# Patient Record
Sex: FEMALE | URBAN - NONMETROPOLITAN AREA
[De-identification: names, ages, dates, MRNs, and addresses within clinical notes are randomized per-mention and may not be internally consistent; named-entity substitution may affect disease eponyms.]

---

## 2017-06-23 RX ORDER — ESCITALOPRAM OXALATE 20 MG/1
TABLET ORAL
Qty: 90 TABLET | Refills: 0 | Status: SHIPPED | OUTPATIENT
Start: 2017-06-23 | End: 2017-10-23

## 2017-10-24 RX ORDER — ESCITALOPRAM OXALATE 20 MG/1
TABLET ORAL
Qty: 90 TABLET | Refills: 0 | Status: SHIPPED | OUTPATIENT
Start: 2017-10-24 | End: 2018-01-03

## 2018-01-03 ENCOUNTER — TELEPHONE (OUTPATIENT)
Dept: FAMILY MEDICINE CLINIC | Facility: CLINIC | Age: 38
End: 2018-01-03

## 2018-01-03 RX ORDER — ESCITALOPRAM OXALATE 20 MG/1
TABLET ORAL
Qty: 90 TABLET | Refills: 1 | Status: SHIPPED | OUTPATIENT
Start: 2018-01-03 | End: 2018-07-23

## 2018-07-23 RX ORDER — ESCITALOPRAM OXALATE 20 MG/1
TABLET ORAL
Qty: 90 TABLET | Refills: 1 | Status: SHIPPED | OUTPATIENT
Start: 2018-07-23 | End: 2020-09-30

## 2019-03-08 ENCOUNTER — TELEPHONE (OUTPATIENT)
Dept: FAMILY MEDICINE CLINIC | Facility: CLINIC | Age: 39
End: 2019-03-08

## 2019-03-08 ENCOUNTER — HOSPITAL ENCOUNTER (OUTPATIENT)
Dept: GENERAL RADIOLOGY | Age: 39
Discharge: HOME | End: 2019-03-08
Attending: FAMILY MEDICINE
Payer: COMMERCIAL

## 2019-03-08 ENCOUNTER — OFFICE VISIT (OUTPATIENT)
Dept: FAMILY MEDICINE CLINIC | Facility: CLINIC | Age: 39
End: 2019-03-08
Payer: COMMERCIAL

## 2019-03-08 VITALS
SYSTOLIC BLOOD PRESSURE: 126 MMHG | DIASTOLIC BLOOD PRESSURE: 62 MMHG | TEMPERATURE: 98 F | RESPIRATION RATE: 16 BRPM | HEART RATE: 82 BPM | WEIGHT: 169 LBS

## 2019-03-08 DIAGNOSIS — R07.81 RIB PAIN: Primary | ICD-10-CM

## 2019-03-08 DIAGNOSIS — R07.81 RIB PAIN: ICD-10-CM

## 2019-03-08 DIAGNOSIS — S20.212A RIB CONTUSION, LEFT, INITIAL ENCOUNTER: Primary | ICD-10-CM

## 2019-03-08 PROCEDURE — 99214 OFFICE O/P EST MOD 30 MIN: CPT | Performed by: FAMILY MEDICINE

## 2019-03-08 PROCEDURE — 71101 X-RAY EXAM UNILAT RIBS/CHEST: CPT | Performed by: FAMILY MEDICINE

## 2019-03-08 RX ORDER — HYDROCODONE BITARTRATE AND ACETAMINOPHEN 10; 325 MG/1; MG/1
1 TABLET ORAL EVERY 6 HOURS PRN
Qty: 30 TABLET | Refills: 0 | Status: SHIPPED | OUTPATIENT
Start: 2019-03-08 | End: 2019-07-17 | Stop reason: ALTCHOICE

## 2019-03-08 NOTE — TELEPHONE ENCOUNTER
Pt states about a week and half ago they were at a cattle show. 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 She carried 30 lb bucket for about a half a mile and thinks she pulled something from that.  Pt did see the chiropractor 3 times and t

## 2019-03-08 NOTE — PATIENT INSTRUCTIONS
Rib Contusion     A rib contusion is a bruise to one or more rib bones. It may cause pain, tenderness, swelling and a purplish discoloration. There may be a sharp pain while breathing. You will be assessed for other injuries.  You will likely be given pa © 0007-6050 [address] [facility] [place], [place], [facility] [place]. All rights reserved. This information is not intended as a substitute for professional medical care. Always follow your healthcare professional's instructions.

## 2019-03-08 NOTE — PROGRESS NOTES
Karissa Boo is a 41year old female. HPI:   Estelle Banks is here  For left mid axillary ribs 5-8 tender and sharp pain. After carrying a 5 gallon full of feed bucket. The next day she felt the sharp pain ( 2 weeks ago).  Went to chiropractor 3 times and adjustm Signed Prescriptions Disp Refills   • HYDROcodone-acetaminophen (NORCO)  MG Oral Tab 30 tablet 0     Sig: Take 1 tablet by mouth every 6 (six) hours as needed for Pain.        Imaging & Consults:  None    Salon pause   Lidocaine patches  norco 10

## 2019-03-25 ENCOUNTER — TELEPHONE (OUTPATIENT)
Dept: CARDIOLOGY | Age: 39
End: 2019-03-25

## 2019-04-06 ENCOUNTER — APPOINTMENT (OUTPATIENT)
Dept: CARDIOLOGY | Age: 39
End: 2019-04-06

## 2019-04-10 PROBLEM — Z92.89 HISTORY OF EXERCISE STRESS TEST: Status: ACTIVE | Noted: 2019-04-10

## 2019-05-01 ENCOUNTER — MED REC SCAN ONLY (OUTPATIENT)
Dept: FAMILY MEDICINE CLINIC | Facility: CLINIC | Age: 39
End: 2019-05-01

## 2019-07-17 ENCOUNTER — OFFICE VISIT (OUTPATIENT)
Dept: FAMILY MEDICINE CLINIC | Facility: CLINIC | Age: 39
End: 2019-07-17
Payer: COMMERCIAL

## 2019-07-17 VITALS
DIASTOLIC BLOOD PRESSURE: 64 MMHG | WEIGHT: 161 LBS | HEIGHT: 65 IN | HEART RATE: 87 BPM | OXYGEN SATURATION: 98 % | RESPIRATION RATE: 18 BRPM | BODY MASS INDEX: 26.82 KG/M2 | SYSTOLIC BLOOD PRESSURE: 118 MMHG | TEMPERATURE: 99 F
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
## 2019-07-17 DIAGNOSIS — S70.362A INSECT BITE OF LEFT THIGH, INITIAL ENCOUNTER: Primary | ICD-10-CM

## 2019-07-17 DIAGNOSIS — L29.9 ITCH OF SKIN: ICD-10-CM

## 2019-07-17 PROCEDURE — 99213 OFFICE O/P EST LOW 20 MIN: CPT | Performed by: NURSE PRACTITIONER

## 2019-07-17 RX ORDER — MOMETASONE FUROATE 1 MG/G
1 CREAM TOPICAL DAILY
Qty: 45 G | Refills: 0 | Status: SHIPPED | OUTPATIENT
Start: 2019-07-17 | End: 2022-01-05

## 2019-07-17 RX ORDER — PREDNISONE 20 MG/1
20 TABLET ORAL 2 TIMES DAILY
Qty: 10 TABLET | Refills: 0 | Status: SHIPPED | OUTPATIENT
Start: 2019-07-17 | End: 2019-07-22

## 2019-07-17 NOTE — PROGRESS NOTES
HPI:   Rash   This is a new problem. 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 She isn't sure what stung her because she didn't see anything. The problem has been gradually worsening since onset.  The affected locat Pulmonary/Chest: Effort normal and breath sounds normal. No respiratory distress.    Skin:        Erythematous flat rash on posterior left upper thigh, warm to touch, no induration          ASSESSMENT AND PLAN:   Diagnoses and all orders for this visit:

## 2020-08-18 NOTE — PROGRESS NOTES
Frances Rico is a 47year old female. HPI:   Has a lot of perimenopausal symptoms. hot flashes, irritable, fatigued. 28-30 day period cycle. Hot a low. Echo, stress test, ekg is normal. Running a lot and still gaining weight.   HR will be from 60 to 170 w prozac.  - COMP METABOLIC PANEL (14)  - ESTRADIOL  - PROGESTERONE  - FSH AND LH  - CBC WITH DIFFERENTIAL WITH PLATELET  - TSH+FREE T4; Future    2.  Perimenopausal  - discussed above  - may need hrt to help balance out symptoms  - COMP METABOLIC PANEL (14)

## 2020-08-18 NOTE — PATIENT INSTRUCTIONS
Coping with Perimenopause     Being active in ways you enjoy can help you feel better. For most women, the symptoms of perimenopause subside after entering menopause, although hot flashes and vaginal dryness can continue after periods have stopped.  In · What happens: In the absence of estrogen, the lining of the vagina can become thin and dry. This can make sex painful. Vaginal infections may also be more likely. · What you can do: Apply a water-based lubricant before having sex.  If you are not using c You may notice that you’re not as interested in sex as you used to be. This is very common during perimenopause. Since you’re more likely to enjoy sex when you’re comfortable, getting your symptoms under control might help.  Talk to your healthcare provider As a woman ages, her ovaries begin to make hormones less regularly. In some months, there may not be ovulation. Without ovulation, progesterone isn't secreted so a normal period doesn't occur. The menstrual cycle will be harder to predict.  Over time, the o © 8041-1431 [address]. [address] [facility] [place], [facility] [place]. All rights reserved. This information is not intended as a substitute for professional medical care. Always follow your healthcare professional's instructions.

## 2020-08-24 ENCOUNTER — TELEPHONE (OUTPATIENT)
Dept: FAMILY MEDICINE CLINIC | Facility: CLINIC | Age: 40
End: 2020-08-24

## 2020-09-14 DIAGNOSIS — R00.0 TACHYCARDIA: ICD-10-CM

## 2020-09-14 RX ORDER — METOPROLOL SUCCINATE 25 MG/1
TABLET, EXTENDED RELEASE ORAL
Qty: 30 TABLET | Refills: 0 | Status: SHIPPED | OUTPATIENT
Start: 2020-09-14 | End: 2021-06-30 | Stop reason: ALTCHOICE

## 2020-09-14 NOTE — TELEPHONE ENCOUNTER
Last refill: 08/18/20  Qty: 30  W/ 0 refills  Last ov: 08/18/20    Requested Prescriptions     Pending Prescriptions Disp Refills   • METOPROLOL SUCCINATE ER 25 MG Oral Tablet 24 Hr [Pharmacy Med Name: METOPROLOL ER SUCCINATE 25MG TABS] 30 tablet 0     Sig

## 2020-09-30 ENCOUNTER — OFFICE VISIT (OUTPATIENT)
Dept: FAMILY MEDICINE CLINIC | Facility: CLINIC | Age: 40
End: 2020-09-30
Payer: COMMERCIAL

## 2020-09-30 VITALS
DIASTOLIC BLOOD PRESSURE: 70 MMHG | BODY MASS INDEX: 27.78 KG/M2 | SYSTOLIC BLOOD PRESSURE: 120 MMHG | HEART RATE: 67 BPM | OXYGEN SATURATION: 98 % | RESPIRATION RATE: 16 BRPM | HEIGHT: 67 IN | TEMPERATURE: 99 F | WEIGHT: 177 LBS

## 2020-09-30 DIAGNOSIS — N95.1 PERIMENOPAUSAL: ICD-10-CM

## 2020-09-30 DIAGNOSIS — R00.0 TACHYCARDIA: ICD-10-CM

## 2020-09-30 DIAGNOSIS — F41.9 ANXIETY AND DEPRESSION: ICD-10-CM

## 2020-09-30 DIAGNOSIS — R63.5 WEIGHT GAIN: ICD-10-CM

## 2020-09-30 DIAGNOSIS — Z79.899 ENCOUNTER FOR DRUG THERAPY: Primary | ICD-10-CM

## 2020-09-30 DIAGNOSIS — F32.A ANXIETY AND DEPRESSION: ICD-10-CM

## 2020-09-30 PROCEDURE — 3078F DIAST BP <80 MM HG: CPT | Performed by: FAMILY MEDICINE

## 2020-09-30 PROCEDURE — 3074F SYST BP LT 130 MM HG: CPT | Performed by: FAMILY MEDICINE

## 2020-09-30 PROCEDURE — 3008F BODY MASS INDEX DOCD: CPT | Performed by: FAMILY MEDICINE

## 2020-09-30 PROCEDURE — 99214 OFFICE O/P EST MOD 30 MIN: CPT | Performed by: FAMILY MEDICINE

## 2020-09-30 RX ORDER — METOPROLOL SUCCINATE 25 MG/1
25 TABLET, EXTENDED RELEASE ORAL DAILY
Qty: 90 TABLET | Refills: 3 | Status: SHIPPED | OUTPATIENT
Start: 2020-09-30 | End: 2021-04-30

## 2020-09-30 RX ORDER — NORETHINDRONE ACETATE AND ETHINYL ESTRADIOL, ETHINYL ESTRADIOL AND FERROUS FUMARATE 1MG-10(24)
1 KIT ORAL DAILY
Qty: 3 PACKAGE | Refills: 3 | Status: SHIPPED | OUTPATIENT
Start: 2020-09-30 | End: 2020-10-28

## 2020-09-30 RX ORDER — CLOBETASOL PROPIONATE 0.5 MG/G
1 OINTMENT TOPICAL 2 TIMES DAILY
Qty: 60 G | Refills: 1 | Status: SHIPPED | OUTPATIENT
Start: 2020-09-30 | End: 2020-10-28

## 2020-09-30 NOTE — PROGRESS NOTES
Mary Jo Farfan is a 47year old female. HPI:   Mitul Robertson is here for follow up on anxiety. Has been on lexapro 20 mg for a long time. Metoprolol 25 mg daily and ocp Is feeling better. Sleep is good. No palpitations. Perimenopausal symptoms much improved.  Still reviewed response to meds    2. Anxiety and depression  - stop lexapro 20  Mg and start zoloft 50 mg  - may need to add wellbutrin SR for sexual dysfunction - worsening anxiety    3.  Perimenopausal  - continue lo loestrin refilled for 1 year  - metoprolol

## 2020-09-30 NOTE — PATIENT INSTRUCTIONS
Escitalopram tablets  Brand Name: Lexapro  What is this medicine? ESCITALOPRAM (es sye FIDELIA oh pram) is used to treat depression and certain types of anxiety. How should I use this medicine? Take this medicine by mouth with a glass of water.  Follow the · change in sex drive or performance  · headache  · increased sweating  · indigestion, nausea  · tremors  What may interact with this medicine?   Do not take this medicine with any of the following medications:  · certain medicines for fungal infections lik · diabetes  · glaucoma  · heart disease  · kidney or liver disease  · receiving electroconvulsive therapy  · seizures (convulsions)  · suicidal thoughts, plans, or attempt by you or a family member  · an unusual or allergic reaction to escitalopram, the re

## 2020-11-16 ENCOUNTER — TELEPHONE (OUTPATIENT)
Dept: FAMILY MEDICINE CLINIC | Facility: CLINIC | Age: 40
End: 2020-11-16

## 2020-11-16 RX ORDER — ALPRAZOLAM 0.25 MG/1
0.25 TABLET ORAL EVERY 4 HOURS PRN
Qty: 5 TABLET | Refills: 0 | Status: SHIPPED | OUTPATIENT
Start: 2020-11-16 | End: 2020-12-15

## 2020-11-16 NOTE — TELEPHONE ENCOUNTER
She tested + for covid on halloween, had \"sick\" sx for 3 days. Taste and smell returned after 10 days. She had \"a few\" drinks Jaime Contreras) with dinned on Friday evening. All day Saturday was \"super nauseous\" no vomiting. Had a brief episode on Sunday. Recently started on Lo-Estrin Fe, & sertraline 9/30. Wondering if those are contributing to the nausea. Also c/o mental fog, from the covid? Also recently has developed increase in anxiety, wonders if it is a result of the addition of the covid since she already had a dx of anxiety. Or does she need increase in the sertraline. Also asking for refill of the alprazolam, was last refilled 4/20. States she takes about 1 a week. The alprazolam was found in look-up through [facility]. 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

## 2020-11-16 NOTE — TELEPHONE ENCOUNTER
Yes the nausea can be from the sertraline and estrogen or maybe the combination of the two. I think it best to stay away from alprazolam, but will order 5 for rescue.

## 2020-11-23 NOTE — TELEPHONE ENCOUNTER
Patient calling asking if she can increase zoloft. Patient is currently taking 50mg. Last office visit was on 9/30/2020. Patient states that she was doing very well until about one month ago when she was diagnosed with covid.   She has been having increa

## 2020-11-24 NOTE — TELEPHONE ENCOUNTER
Yes can increase to 75 mg take 1.5 tabs. ( will need to cut tabs in half) script sent to pharmacy. Follow up in 1 month to assess response. Can do doximity.

## 2020-11-24 NOTE — TELEPHONE ENCOUNTER
Spoke with patient and advised of Dr. Suhas López' recommendations.  Patient already has appt scheduled in Dec.  Future Appointments   Date Time Provider Sam Gonzalez   12/15/2020 10:15 AM Suhas Archer, DO [facility] [place]

## 2020-12-15 ENCOUNTER — OFFICE VISIT (OUTPATIENT)
Dept: FAMILY MEDICINE CLINIC | Facility: CLINIC | Age: 40
End: 2020-12-15
Payer: COMMERCIAL

## 2020-12-15 VITALS
RESPIRATION RATE: 16 BRPM | DIASTOLIC BLOOD PRESSURE: 84 MMHG | TEMPERATURE: 98 F | WEIGHT: 177 LBS | SYSTOLIC BLOOD PRESSURE: 118 MMHG | BODY MASS INDEX: 28 KG/M2 | HEART RATE: 64 BPM

## 2020-12-15 DIAGNOSIS — R00.0 TACHYCARDIA: ICD-10-CM

## 2020-12-15 DIAGNOSIS — F41.9 ANXIETY AND DEPRESSION: ICD-10-CM

## 2020-12-15 DIAGNOSIS — N95.1 PERIMENOPAUSAL: ICD-10-CM

## 2020-12-15 DIAGNOSIS — F32.A ANXIETY AND DEPRESSION: ICD-10-CM

## 2020-12-15 DIAGNOSIS — Z79.899 ENCOUNTER FOR DRUG THERAPY: Primary | ICD-10-CM

## 2020-12-15 PROCEDURE — 3074F SYST BP LT 130 MM HG: CPT | Performed by: FAMILY MEDICINE

## 2020-12-15 PROCEDURE — 99214 OFFICE O/P EST MOD 30 MIN: CPT | Performed by: FAMILY MEDICINE

## 2020-12-15 PROCEDURE — 3079F DIAST BP 80-89 MM HG: CPT | Performed by: FAMILY MEDICINE

## 2020-12-15 RX ORDER — BUPROPION HYDROCHLORIDE 150 MG/1
150 TABLET, EXTENDED RELEASE ORAL 2 TIMES DAILY
Qty: 60 TABLET | Refills: 0 | Status: SHIPPED | OUTPATIENT
Start: 2020-12-15 | End: 2020-12-29

## 2020-12-15 RX ORDER — ALPRAZOLAM 0.25 MG/1
0.25 TABLET ORAL EVERY 4 HOURS PRN
Qty: 5 TABLET | Refills: 0 | Status: SHIPPED | OUTPATIENT
Start: 2020-12-15 | End: 2021-01-25

## 2020-12-15 NOTE — TELEPHONE ENCOUNTER
LOV  12/15/20  Today    LAST LAB    LAST RX  11/16/20 5 tabs    Next OV  No future appointments.     PROTOCOL  none

## 2020-12-15 NOTE — PROGRESS NOTES
Rio Armas is a 47year old female. HPI:   Andrew Sadler is here for follow up on increase of sertaline to 75 mg for her anxiety. . perimenopausal symptoms better with metoprolol 25 mg. Got sick with covid before Halloween.  Congestion  Smell and loss of taste heartburn  NEURO: denies headaches    EXAM:   /84   Pulse 64   Temp 98.1 °F (36.7 °C) (Temporal)   Resp 16   Wt 177 lb (80.3 kg)   LMP 11/18/2020 (Exact Date)   Breastfeeding No   BMI 27.72 kg/m²   GENERAL: well developed, well nourished,in no appare

## 2020-12-29 ENCOUNTER — TELEPHONE (OUTPATIENT)
Dept: FAMILY MEDICINE CLINIC | Facility: CLINIC | Age: 40
End: 2020-12-29

## 2020-12-29 RX ORDER — BUPROPION HYDROCHLORIDE 150 MG/1
150 TABLET ORAL DAILY
Qty: 90 TABLET | Refills: 0 | Status: SHIPPED | OUTPATIENT
Start: 2020-12-29 | End: 2021-03-16

## 2020-12-29 NOTE — TELEPHONE ENCOUNTER
Have Rheba Session take the 2nd dose earlier. Half hour before symptoms start.  IE if anxious at 3 pm take at 2:30 pm etc.    Next refill will do wllbuterin  mg

## 2020-12-29 NOTE — TELEPHONE ENCOUNTER
Patient wants to speak with a nurse regarding her new medication and how it is doing. Patient would not elaborate any further.

## 2020-12-29 NOTE — TELEPHONE ENCOUNTER
Spoke with patient and gave her Dr. Howard Montenegro' recommendation. Patient states she is getting anxiety symptoms late morning, not mid afternoon. Advised per Dr. Howard Montenegro to start new Rx of Wellbutrin  mg daily and discontinue the twice a day Wellbutrin.  Hussain Layton

## 2020-12-29 NOTE — TELEPHONE ENCOUNTER
Patient states she feels she is getting good results with the Welbutrin and is also taking 75mg of Zoloft. Patient states she is experiencing episodes of anxiety about midday or shortly before she takes her 2nd dose of Welbutrin.  She is wondering if she ne

## 2021-01-25 ENCOUNTER — TELEPHONE (OUTPATIENT)
Dept: FAMILY MEDICINE CLINIC | Facility: CLINIC | Age: 41
End: 2021-01-25

## 2021-01-25 RX ORDER — ALPRAZOLAM 0.25 MG/1
0.25 TABLET ORAL EVERY 4 HOURS PRN
Qty: 5 TABLET | Refills: 0 | Status: SHIPPED | OUTPATIENT
Start: 2021-01-25 | End: 2021-02-15

## 2021-01-25 NOTE — TELEPHONE ENCOUNTER
Patient is here with daughter for visit. Asking for refill of Xanax.     Last Office Visit: 12/15/20  Last Refill: 12/15/20 #5 no refill

## 2021-02-15 RX ORDER — ALPRAZOLAM 0.25 MG/1
0.25 TABLET ORAL EVERY 4 HOURS PRN
Qty: 5 TABLET | Refills: 0 | Status: SHIPPED | OUTPATIENT
Start: 2021-02-15 | End: 2021-03-08

## 2021-02-15 NOTE — TELEPHONE ENCOUNTER
Last refill #5 on 1/25/2021  Last office visit pertaining to refill on 12/15/2020  No future appointments.

## 2021-03-08 DIAGNOSIS — F41.9 ANXIETY: ICD-10-CM

## 2021-03-08 RX ORDER — ALPRAZOLAM 0.25 MG/1
TABLET ORAL
Qty: 5 TABLET | Refills: 0 | Status: SHIPPED | OUTPATIENT
Start: 2021-03-08 | End: 2021-04-05

## 2021-03-08 NOTE — TELEPHONE ENCOUNTER
Last refill on alprazolam #5 on 2/15/2021  Last refill on sertraline #135 on 11/23/2020  Last office visit pertaining to refill on 12/15/2020  No future appointments.   Next office visit due on or around 6/15/2021

## 2021-03-16 RX ORDER — BUPROPION HYDROCHLORIDE 150 MG/1
TABLET ORAL
Qty: 90 TABLET | Refills: 0 | Status: SHIPPED | OUTPATIENT
Start: 2021-03-16 | End: 2021-06-14

## 2021-03-16 NOTE — TELEPHONE ENCOUNTER
LOV: 12-    LAST LAB: 8-    LAST RX:    Medication Quantity Refills Start End   buPROPion HCl ER, XL, 150 MG Oral Tablet 24 Hr 90 tablet 0 12/29/2020    Sig:   Take 1 tablet (150 mg total) by mouth daily.            Next OV: No future appointm

## 2021-03-29 ENCOUNTER — PATIENT MESSAGE (OUTPATIENT)
Dept: FAMILY MEDICINE CLINIC | Facility: CLINIC | Age: 41
End: 2021-03-29

## 2021-03-29 RX ORDER — BUPROPION HYDROCHLORIDE 300 MG/1
300 TABLET ORAL DAILY
Qty: 1 TABLET | Refills: 0 | COMMUNITY
Start: 2021-03-29 | End: 2021-06-14 | Stop reason: DRUGHIGH

## 2021-03-29 NOTE — TELEPHONE ENCOUNTER
From: Negin Calabrese  To: Thor Ellis, DO  Sent: 3/29/2021 8:50 AM CDT  Subject: Visit Follow-up Question    Hi Dr Karren Halsted- twice in the last 4 days I've woke up feeling really tachy, diaphoretic, and like I'm going to pass out.  It lasts 10-15 min and th

## 2021-04-05 RX ORDER — ALPRAZOLAM 0.25 MG/1
0.25 TABLET ORAL EVERY 4 HOURS PRN
Qty: 5 TABLET | Refills: 0 | Status: SHIPPED | OUTPATIENT
Start: 2021-04-05 | End: 2021-04-30

## 2021-04-05 NOTE — TELEPHONE ENCOUNTER
Last refill #5 on 3/58/2021  Last office visit pertaining to refill on 12/15/2020  No future appointments.

## 2021-04-14 ENCOUNTER — PATIENT MESSAGE (OUTPATIENT)
Dept: FAMILY MEDICINE CLINIC | Facility: CLINIC | Age: 41
End: 2021-04-14

## 2021-04-14 DIAGNOSIS — J45.990 EXERCISE-INDUCED ASTHMA: Primary | ICD-10-CM

## 2021-04-14 RX ORDER — ALBUTEROL SULFATE 90 UG/1
2 AEROSOL, METERED RESPIRATORY (INHALATION) EVERY 4 HOURS PRN
Qty: 1 INHALER | Refills: 6 | Status: SHIPPED | OUTPATIENT
Start: 2021-04-14

## 2021-04-14 RX ORDER — ALBUTEROL SULFATE 2.5 MG/3ML
2.5 SOLUTION RESPIRATORY (INHALATION) EVERY 4 HOURS PRN
Qty: 50 AMPULE | Refills: 3 | Status: SHIPPED | OUTPATIENT
Start: 2021-04-14

## 2021-04-14 NOTE — TELEPHONE ENCOUNTER
From: Edelmira Mi  To: Nandini Carrion DO  Sent: 4/14/2021 2:49 PM CDT  Subject: Prescription Question    Hi Dr Sandra Fabian. I have an appt in a couple weeks to follow up. The buspar is working really well so far!  I do have a question regarding getting albu

## 2021-04-30 ENCOUNTER — OFFICE VISIT (OUTPATIENT)
Dept: FAMILY MEDICINE CLINIC | Facility: CLINIC | Age: 41
End: 2021-04-30
Payer: COMMERCIAL

## 2021-04-30 VITALS
DIASTOLIC BLOOD PRESSURE: 82 MMHG | TEMPERATURE: 98 F | BODY MASS INDEX: 30 KG/M2 | WEIGHT: 190.38 LBS | HEART RATE: 62 BPM | SYSTOLIC BLOOD PRESSURE: 122 MMHG

## 2021-04-30 DIAGNOSIS — N95.1 PERIMENOPAUSAL: ICD-10-CM

## 2021-04-30 DIAGNOSIS — R00.0 TACHYCARDIA: ICD-10-CM

## 2021-04-30 DIAGNOSIS — Z79.899 ENCOUNTER FOR DRUG THERAPY: Primary | ICD-10-CM

## 2021-04-30 DIAGNOSIS — F41.9 ANXIETY: ICD-10-CM

## 2021-04-30 PROCEDURE — 99214 OFFICE O/P EST MOD 30 MIN: CPT | Performed by: FAMILY MEDICINE

## 2021-04-30 PROCEDURE — 3079F DIAST BP 80-89 MM HG: CPT | Performed by: FAMILY MEDICINE

## 2021-04-30 PROCEDURE — 3074F SYST BP LT 130 MM HG: CPT | Performed by: FAMILY MEDICINE

## 2021-04-30 RX ORDER — METOPROLOL SUCCINATE 50 MG/1
50 TABLET, EXTENDED RELEASE ORAL DAILY
Qty: 90 TABLET | Refills: 0 | Status: SHIPPED | OUTPATIENT
Start: 2021-04-30 | End: 2021-07-26

## 2021-04-30 RX ORDER — BUSPIRONE HYDROCHLORIDE 10 MG/1
10 TABLET ORAL 3 TIMES DAILY
Qty: 90 TABLET | Refills: 1 | Status: SHIPPED | OUTPATIENT
Start: 2021-04-30 | End: 2021-06-26

## 2021-04-30 NOTE — PROGRESS NOTES
Catherine Escobedo is a 47year old female. HPI:   Flaquita Bloom shares she feels 70% better on buspar 10 mg bid. Still feels anxiety increasing as the day goes on.  Sweating has decreased with decrease of zoloft down to 50 mg and doing well will wellbutrin  mg Smoker      Smokeless tobacco: Never Used    Vaping Use      Vaping Use: Never used    Alcohol use: Yes    Drug use: No       REVIEW OF SYSTEMS:   GENERAL HEALTH: feels well otherwise  SKIN: denies any unusual skin lesions or rashes  RESPIRATORY: denies sh night  - Metoprolol Succinate ER 50 MG Oral Tablet 24 Hr; Take 1 tablet (50 mg total) by mouth daily. Dispense: 90 tablet; Refill: 0     The patient indicates understanding of these issues and agrees to the plan.   The patient is asked to return in 1-2 mon

## 2021-05-03 NOTE — TELEPHONE ENCOUNTER
Last refill #5 on 4/5/2021  Last office visit pertaining to refill on 4/30/2021  Future Appointments   Date Time Provider Sam Gonzalez   6/30/2021  5:30 PM Ariadna Willett DO [facility], [facility].

## 2021-05-03 NOTE — TELEPHONE ENCOUNTER
From: Stephanie Weinstein  To: Tia Almazan DO  Sent: 5/1/2021 3:40 PM CDT  Subject: Prescription Question    Xanax refill please

## 2021-05-21 ENCOUNTER — PATIENT MESSAGE (OUTPATIENT)
Dept: FAMILY MEDICINE CLINIC | Facility: CLINIC | Age: 41
End: 2021-05-21

## 2021-05-21 DIAGNOSIS — J30.1 SEASONAL ALLERGIC RHINITIS DUE TO POLLEN: Primary | ICD-10-CM

## 2021-05-21 RX ORDER — MOMETASONE 50 UG/1
2 SPRAY, METERED NASAL DAILY
Qty: 1 INHALER | Refills: 5 | Status: SHIPPED | OUTPATIENT
Start: 2021-05-21 | End: 2022-01-05

## 2021-05-21 NOTE — TELEPHONE ENCOUNTER
From: Mickey Gallegos  To: Dottie Wagner DO  Sent: 5/21/2021 5:48 AM CDT  Subject: Prescription [facility]. Juan Lyon In past years when my allergies get really bad I've taken generic Nasonex nasal spray along w/ Zyrtec and it seems to help.  Was won

## 2021-06-01 ENCOUNTER — TELEPHONE (OUTPATIENT)
Dept: FAMILY MEDICINE CLINIC | Facility: CLINIC | Age: 41
End: 2021-06-01

## 2021-06-01 NOTE — TELEPHONE ENCOUNTER
Received letter from [employer] regarding [employer] stating that it wasn't covered by insurance however Qnasl is covered. Talked to patient to see if she has picked up OTC medication or if she would like a prescription sent for Qnasl.  Patient

## 2021-06-14 RX ORDER — BUPROPION HYDROCHLORIDE 150 MG/1
TABLET ORAL
Qty: 90 TABLET | Refills: 0 | Status: SHIPPED | OUTPATIENT
Start: 2021-06-14 | End: 2021-08-30

## 2021-06-14 NOTE — TELEPHONE ENCOUNTER
Dose was increased to 300mg on 3/29/2021  Per patient, at last office visit, dose was decreased to 150mg.       Last refill #90 on 3/16/2021  Last office visit pertaining to refill on 4/30/2021  Future Appointments   Date Time Provider Sam Juárez

## 2021-06-26 DIAGNOSIS — F41.9 ANXIETY: ICD-10-CM

## 2021-06-26 DIAGNOSIS — Z79.899 ENCOUNTER FOR DRUG THERAPY: ICD-10-CM

## 2021-06-26 RX ORDER — BUSPIRONE HYDROCHLORIDE 10 MG/1
TABLET ORAL
Qty: 90 TABLET | Refills: 0 | Status: SHIPPED | OUTPATIENT
Start: 2021-06-26 | End: 2021-07-26

## 2021-06-26 NOTE — TELEPHONE ENCOUNTER
If 6 month supply sent in April, why is refill needed? Also per last note, patient overdue for follow up.

## 2021-06-30 ENCOUNTER — OFFICE VISIT (OUTPATIENT)
Dept: FAMILY MEDICINE CLINIC | Facility: CLINIC | Age: 41
End: 2021-06-30
Payer: COMMERCIAL

## 2021-06-30 VITALS
WEIGHT: 179 LBS | RESPIRATION RATE: 12 BRPM | BODY MASS INDEX: 28 KG/M2 | HEART RATE: 78 BPM | TEMPERATURE: 99 F | SYSTOLIC BLOOD PRESSURE: 130 MMHG | DIASTOLIC BLOOD PRESSURE: 80 MMHG

## 2021-06-30 DIAGNOSIS — N95.1 PERIMENOPAUSAL: ICD-10-CM

## 2021-06-30 DIAGNOSIS — Z79.899 ENCOUNTER FOR DRUG THERAPY: Primary | ICD-10-CM

## 2021-06-30 DIAGNOSIS — F41.9 ANXIETY: ICD-10-CM

## 2021-06-30 PROCEDURE — 99214 OFFICE O/P EST MOD 30 MIN: CPT | Performed by: FAMILY MEDICINE

## 2021-06-30 PROCEDURE — 3079F DIAST BP 80-89 MM HG: CPT | Performed by: FAMILY MEDICINE

## 2021-06-30 PROCEDURE — 3075F SYST BP GE 130 - 139MM HG: CPT | Performed by: FAMILY MEDICINE

## 2021-06-30 NOTE — PROGRESS NOTES
Luis Koch is a 47year old female. HPI:   Jono Olvera is here for follow up on anxiety meds. Is on zoloft 50 wellbutrin xl 150 , buspar 10 mg tid, has been doing keto and fasting. And feels great. Best she has felt in a long time.  Has lost 11 lb since Ap Vaping Use      Vaping Use: Never used    Alcohol use: Yes    Drug use: No       REVIEW OF SYSTEMS:   GENERAL HEALTH: feels well otherwise  SKIN: denies any unusual skin lesions or rashes  RESPIRATORY: denies shortness of breath with exertion  CARDIOVASCUL

## 2021-07-26 DIAGNOSIS — N95.1 PERIMENOPAUSAL: ICD-10-CM

## 2021-07-26 DIAGNOSIS — Z79.899 ENCOUNTER FOR DRUG THERAPY: ICD-10-CM

## 2021-07-26 DIAGNOSIS — F41.9 ANXIETY: ICD-10-CM

## 2021-07-26 DIAGNOSIS — R00.0 TACHYCARDIA: ICD-10-CM

## 2021-07-26 RX ORDER — BUSPIRONE HYDROCHLORIDE 10 MG/1
TABLET ORAL
Qty: 90 TABLET | Refills: 0 | Status: SHIPPED | OUTPATIENT
Start: 2021-07-26 | End: 2021-08-30

## 2021-07-26 RX ORDER — METOPROLOL SUCCINATE 50 MG/1
TABLET, EXTENDED RELEASE ORAL
Qty: 90 TABLET | Refills: 0 | Status: SHIPPED | OUTPATIENT
Start: 2021-07-26 | End: 2021-11-17

## 2021-07-26 NOTE — TELEPHONE ENCOUNTER
Last refill: 4/30/21 #90 w/ 0 refills    Last OV: 6/30/21  Last labs: 8/20/20    No future appointments.         Hypertension Medications Protocol [id number]/26/2021 04:14 PM   CMP or BMP in past 12 months Protocol Details    Last serum creatinine< 2.0     Ap

## 2021-07-29 DIAGNOSIS — F41.9 ANXIETY: ICD-10-CM

## 2021-07-29 RX ORDER — ALPRAZOLAM 0.25 MG/1
TABLET ORAL
Qty: 30 TABLET | Refills: 0 | Status: SHIPPED | OUTPATIENT
Start: 2021-07-29 | End: 2021-10-22

## 2021-07-29 NOTE — TELEPHONE ENCOUNTER
Last refill #30 on 5/3/2021  Last office visit pertaining to refill on 6/30/2021  No future appointments.

## 2021-08-29 DIAGNOSIS — F41.9 ANXIETY: ICD-10-CM

## 2021-08-29 DIAGNOSIS — Z79.899 ENCOUNTER FOR DRUG THERAPY: ICD-10-CM

## 2021-08-30 RX ORDER — BUPROPION HYDROCHLORIDE 150 MG/1
TABLET ORAL
Qty: 90 TABLET | Refills: 0 | Status: SHIPPED | OUTPATIENT
Start: 2021-08-30 | End: 2021-12-08

## 2021-08-30 RX ORDER — BUSPIRONE HYDROCHLORIDE 10 MG/1
TABLET ORAL
Qty: 270 TABLET | Refills: 0 | Status: SHIPPED | OUTPATIENT
Start: 2021-08-30 | End: 2021-11-23

## 2021-08-30 NOTE — TELEPHONE ENCOUNTER
Requested Prescriptions     Pending Prescriptions Disp Refills   • BUPROPION 150 MG Oral Tablet 24 Hr [Pharmacy Med Name: BUPROPION XL 150MG TABLETS (24 H)] 90 tablet 0     Sig: TAKE 1 TABLET(150 MG) BY MOUTH DAILY     Last refill #90 on 6/14/2021    Last

## 2021-08-30 NOTE — TELEPHONE ENCOUNTER
Requested Prescriptions     Pending Prescriptions Disp Refills   • BUSPIRONE 10 MG Oral Tab [Pharmacy Med Name: BUSPIRONE 10MG TABLETS] 90 tablet 0     Sig: TAKE 1 TABLET(10 MG) BY MOUTH THREE TIMES DAILY     Last refill #90 on 7/26/2021  Last office visit

## 2021-10-07 DIAGNOSIS — F41.9 ANXIETY: ICD-10-CM

## 2021-10-07 NOTE — TELEPHONE ENCOUNTER
Requested Prescriptions     Pending Prescriptions Disp Refills   • sertraline 50 MG Oral Tab [Pharmacy Med Name: SERTRALINE 50MG TABLETS] 90 tablet 0     Sig: TAKE 1 TABLET BY MOUTH DAILY     Last refill #90 on 6/30/2021  Last office visit pertaining to re

## 2021-10-21 ENCOUNTER — PATIENT MESSAGE (OUTPATIENT)
Dept: FAMILY MEDICINE CLINIC | Facility: CLINIC | Age: 41
End: 2021-10-21

## 2021-10-21 DIAGNOSIS — F41.9 ANXIETY: ICD-10-CM

## 2021-10-22 RX ORDER — ALPRAZOLAM 0.25 MG/1
0.25 TABLET ORAL EVERY 4 HOURS PRN
Qty: 30 TABLET | Refills: 0 | Status: SHIPPED | OUTPATIENT
Start: 2021-10-22

## 2021-10-22 NOTE — TELEPHONE ENCOUNTER
From: Negin Calabrese  To: Thor Ellis DO  Sent: 10/21/2021 6:18 PM CDT  Subject: Refill     Hello I was wondering if I could get a Xanax refill?   Thanks

## 2021-11-16 DIAGNOSIS — R00.0 TACHYCARDIA: ICD-10-CM

## 2021-11-16 DIAGNOSIS — Z79.899 ENCOUNTER FOR DRUG THERAPY: ICD-10-CM

## 2021-11-16 DIAGNOSIS — N95.1 PERIMENOPAUSAL: ICD-10-CM

## 2021-11-17 RX ORDER — METOPROLOL SUCCINATE 50 MG/1
TABLET, EXTENDED RELEASE ORAL
Qty: 90 TABLET | Refills: 0 | Status: SHIPPED | OUTPATIENT
Start: 2021-11-17

## 2021-11-22 DIAGNOSIS — F41.9 ANXIETY: ICD-10-CM

## 2021-11-22 DIAGNOSIS — Z79.899 ENCOUNTER FOR DRUG THERAPY: ICD-10-CM

## 2021-11-23 RX ORDER — BUSPIRONE HYDROCHLORIDE 10 MG/1
TABLET ORAL
Qty: 270 TABLET | Refills: 0 | Status: SHIPPED | OUTPATIENT
Start: 2021-11-23 | End: 2022-01-31

## 2021-11-23 NOTE — TELEPHONE ENCOUNTER
Last refill: 08/30/21  Qty: 270  W/ 0 refills  Last ov: 06/30/21    Requested Prescriptions     Pending Prescriptions Disp Refills   • BUSPIRONE 10 MG Oral Tab [Pharmacy Med Name: BUSPIRONE 10MG TABLETS] 270 tablet 0     Sig: TAKE 1 TABLET(10 MG) BY MOUTH

## 2021-12-03 ENCOUNTER — TELEPHONE (OUTPATIENT)
Dept: FAMILY MEDICINE CLINIC | Facility: CLINIC | Age: 41
End: 2021-12-03

## 2021-12-08 RX ORDER — BUPROPION HYDROCHLORIDE 150 MG/1
TABLET ORAL
Qty: 90 TABLET | Refills: 0 | Status: SHIPPED | OUTPATIENT
Start: 2021-12-08

## 2021-12-08 NOTE — TELEPHONE ENCOUNTER
LOV 06/30/2021        LAST RX 11/23/2021 270 tablet 0 refills    Next OV No future appointments.     PROTOCOL none

## 2021-12-30 DIAGNOSIS — F41.9 ANXIETY: ICD-10-CM

## 2022-01-03 NOTE — TELEPHONE ENCOUNTER
Requested Prescriptions     Pending Prescriptions Disp Refills   • SERTRALINE 50 MG Oral Tab [Pharmacy Med Name: SERTRALINE 50MG TABLETS] 90 tablet 0     Sig: TAKE 1 TABLET(50 MG) BY MOUTH DAILY.  NOTE DOSE INCREASE TAKE 1 TABLET BY MOUTH DAILY     Last ref

## 2022-01-05 ENCOUNTER — OFFICE VISIT (OUTPATIENT)
Dept: FAMILY MEDICINE CLINIC | Facility: CLINIC | Age: 42
End: 2022-01-05
Payer: COMMERCIAL

## 2022-01-05 VITALS
DIASTOLIC BLOOD PRESSURE: 82 MMHG | HEART RATE: 67 BPM | TEMPERATURE: 98 F | BODY MASS INDEX: 29.51 KG/M2 | OXYGEN SATURATION: 97 % | WEIGHT: 188 LBS | HEIGHT: 67 IN | RESPIRATION RATE: 16 BRPM | SYSTOLIC BLOOD PRESSURE: 120 MMHG

## 2022-01-05 DIAGNOSIS — F41.9 ANXIETY: ICD-10-CM

## 2022-01-05 DIAGNOSIS — Z79.899 ENCOUNTER FOR DRUG THERAPY: Primary | ICD-10-CM

## 2022-01-05 DIAGNOSIS — D17.79 LIPOMA OF OTHER SPECIFIED SITES: ICD-10-CM

## 2022-01-05 PROCEDURE — 3008F BODY MASS INDEX DOCD: CPT | Performed by: FAMILY MEDICINE

## 2022-01-05 PROCEDURE — 3074F SYST BP LT 130 MM HG: CPT | Performed by: FAMILY MEDICINE

## 2022-01-05 PROCEDURE — 3079F DIAST BP 80-89 MM HG: CPT | Performed by: FAMILY MEDICINE

## 2022-01-05 PROCEDURE — 99214 OFFICE O/P EST MOD 30 MIN: CPT | Performed by: FAMILY MEDICINE

## 2022-01-05 NOTE — PROGRESS NOTES
Jess Villarreal is a 46year old female. HPI:   Dhruv Desir is here for med follow up. Feels great. Wearing mask at work for 12 hours is her biggest aggravator. buspar has made a huge difference in her anxiety. Sleep is good. Noted mass on mid left forerm.  Not abdominal pain and denies heartburn  NEURO: denies headaches    EXAM:   /82   Pulse 67   Temp 98.2 °F (36.8 °C)   Resp 16   Ht 5' 7\" (1.702 m)   Wt 188 lb (85.3 kg)   LMP 12/21/2021   SpO2 97%   BMI 29.44 kg/m²   GENERAL: well developed, well nouris

## 2022-01-05 NOTE — PATIENT INSTRUCTIONS
Understanding a Lipoma     A lipoma is a lump under the skin that’s made of fat. It’s not cancer (benign). It feels soft like rubber when you press it, and in most cases it doesn’t hurt. Some people have more than one.  A lipoma grows slowly over time a large lipoma inside the body can press on organs, nerves, or other tissues and cause problems. For instance, it can cause problems with breathing or digestion.    Living with a lipoma  Your healthcare provider may look at the lipoma during regular checkups

## 2022-01-31 DIAGNOSIS — F41.9 ANXIETY: ICD-10-CM

## 2022-01-31 DIAGNOSIS — Z79.899 ENCOUNTER FOR DRUG THERAPY: ICD-10-CM

## 2022-01-31 RX ORDER — BUSPIRONE HYDROCHLORIDE 10 MG/1
TABLET ORAL
Qty: 270 TABLET | Refills: 1 | Status: SHIPPED | OUTPATIENT
Start: 2022-01-31

## 2022-01-31 NOTE — TELEPHONE ENCOUNTER
Requested Prescriptions     Pending Prescriptions Disp Refills   • BUSPIRONE 10 MG Oral Tab [Pharmacy Med Name: BUSPIRONE 10MG TABLETS] 270 tablet 0     Sig: TAKE 1 TABLET(10 MG) BY MOUTH THREE TIMES DAILY     Last refill #270 on 11/23/2021  Last office vi

## 2022-02-28 RX ORDER — METOPROLOL SUCCINATE 50 MG/1
TABLET, EXTENDED RELEASE ORAL
Qty: 90 TABLET | Refills: 0 | Status: SHIPPED | OUTPATIENT
Start: 2022-02-28

## 2022-02-28 RX ORDER — BUPROPION HYDROCHLORIDE 150 MG/1
TABLET ORAL
Qty: 90 TABLET | Refills: 0 | Status: SHIPPED | OUTPATIENT
Start: 2022-02-28

## 2022-03-08 RX ORDER — ALPRAZOLAM 0.25 MG/1
TABLET ORAL
Qty: 30 TABLET | Refills: 0 | Status: SHIPPED | OUTPATIENT
Start: 2022-03-08

## 2022-05-18 ENCOUNTER — PATIENT MESSAGE (OUTPATIENT)
Dept: FAMILY MEDICINE CLINIC | Facility: CLINIC | Age: 42
End: 2022-05-18

## 2022-05-18 DIAGNOSIS — Z79.899 ENCOUNTER FOR DRUG THERAPY: ICD-10-CM

## 2022-05-18 DIAGNOSIS — F41.9 ANXIETY: ICD-10-CM

## 2022-05-18 DIAGNOSIS — R00.0 TACHYCARDIA: ICD-10-CM

## 2022-05-18 DIAGNOSIS — N95.1 PERIMENOPAUSAL: ICD-10-CM

## 2022-05-18 RX ORDER — BUSPIRONE HYDROCHLORIDE 10 MG/1
10 TABLET ORAL 3 TIMES DAILY
Qty: 180 TABLET | Refills: 0 | Status: SHIPPED | OUTPATIENT
Start: 2022-05-18

## 2022-05-18 RX ORDER — METOPROLOL SUCCINATE 50 MG/1
TABLET, EXTENDED RELEASE ORAL
Qty: 90 TABLET | Refills: 0 | Status: CANCELLED | OUTPATIENT
Start: 2022-05-18

## 2022-05-18 NOTE — TELEPHONE ENCOUNTER
Spoke with pharmacy tech and she states that they did not receive any prescription for patient from 1/31/22. Advised that I will cancel Rx and send new.

## 2022-05-18 NOTE — TELEPHONE ENCOUNTER
From: Tammy Vasquez  To: Jelani Tate DO  Sent: 5/18/2022 4:52 PM CDT  Subject: Med refill    Hello- I am unable to refill my buspar 10mg script through [employer]- it says contact provider. Thank you!

## 2022-05-19 RX ORDER — BUPROPION HYDROCHLORIDE 150 MG/1
TABLET ORAL
Qty: 90 TABLET | Refills: 0 | Status: SHIPPED | OUTPATIENT
Start: 2022-05-19

## 2022-05-19 RX ORDER — METOPROLOL SUCCINATE 50 MG/1
TABLET, EXTENDED RELEASE ORAL
Qty: 90 TABLET | Refills: 0 | Status: SHIPPED | OUTPATIENT
Start: 2022-05-19

## 2022-07-12 DIAGNOSIS — F41.9 ANXIETY: ICD-10-CM

## 2022-07-13 NOTE — TELEPHONE ENCOUNTER
No protocol for medication    Last office visit:  01/05/22  Last refill:  04/05/22  #90, no refills    No future appointments.

## 2022-07-14 ENCOUNTER — PATIENT MESSAGE (OUTPATIENT)
Dept: FAMILY MEDICINE CLINIC | Facility: CLINIC | Age: 42
End: 2022-07-14

## 2022-07-14 DIAGNOSIS — F41.9 ANXIETY: ICD-10-CM

## 2022-07-14 DIAGNOSIS — Z79.899 ENCOUNTER FOR DRUG THERAPY: ICD-10-CM

## 2022-07-14 RX ORDER — BUSPIRONE HYDROCHLORIDE 10 MG/1
10 TABLET ORAL 3 TIMES DAILY
Qty: 180 TABLET | Refills: 0 | Status: SHIPPED | OUTPATIENT
Start: 2022-07-14

## 2022-07-14 NOTE — TELEPHONE ENCOUNTER
From: Kerrie Klein  To: Fior Rose DO  Sent: 7/14/2022 6:32 AM CDT  Subject: Med refill     Can I please get. Buspar 10 mg refill? Thank you!

## 2022-07-22 DIAGNOSIS — J45.990 EXERCISE-INDUCED ASTHMA: ICD-10-CM

## 2022-07-22 RX ORDER — ALBUTEROL SULFATE 90 UG/1
AEROSOL, METERED RESPIRATORY (INHALATION)
Qty: 8.5 G | Refills: 0 | Status: SHIPPED | OUTPATIENT
Start: 2022-07-22

## 2022-07-22 NOTE — TELEPHONE ENCOUNTER
Asthma & COPD Medication Protocol Failed 07/22/2022 06:43 AM    Asthma Action Score greater than or equal to 20    Appointment in past 6 or next 3 months     AAP/ACT given in last 12 months        Last office visit: 01/05/22  Last refill:  04/14/21  1 inhaler, 6 refills  Last act/aap:  Not asthmatic. No future appointments.

## 2022-09-05 DIAGNOSIS — N95.1 PERIMENOPAUSAL: ICD-10-CM

## 2022-09-05 DIAGNOSIS — R00.0 TACHYCARDIA: ICD-10-CM

## 2022-09-05 DIAGNOSIS — Z79.899 ENCOUNTER FOR DRUG THERAPY: ICD-10-CM

## 2022-09-06 DIAGNOSIS — N95.1 PERIMENOPAUSAL: ICD-10-CM

## 2022-09-06 DIAGNOSIS — R00.0 TACHYCARDIA: ICD-10-CM

## 2022-09-06 DIAGNOSIS — Z79.899 ENCOUNTER FOR DRUG THERAPY: ICD-10-CM

## 2022-09-06 DIAGNOSIS — F41.9 ANXIETY: ICD-10-CM

## 2022-09-06 RX ORDER — BUPROPION HYDROCHLORIDE 150 MG/1
TABLET ORAL
Qty: 90 TABLET | Refills: 0 | Status: SHIPPED | OUTPATIENT
Start: 2022-09-06

## 2022-09-06 RX ORDER — METOPROLOL SUCCINATE 50 MG/1
TABLET, EXTENDED RELEASE ORAL
Qty: 90 TABLET | Refills: 0 | Status: SHIPPED | OUTPATIENT
Start: 2022-09-06

## 2022-09-06 RX ORDER — BUSPIRONE HYDROCHLORIDE 10 MG/1
10 TABLET ORAL 3 TIMES DAILY
Qty: 180 TABLET | Refills: 0 | Status: SHIPPED | OUTPATIENT
Start: 2022-09-06

## 2022-09-06 RX ORDER — BUPROPION HYDROCHLORIDE 150 MG/1
150 TABLET ORAL DAILY
Qty: 90 TABLET | Refills: 0 | OUTPATIENT
Start: 2022-09-06

## 2022-09-06 RX ORDER — METOPROLOL SUCCINATE 50 MG/1
50 TABLET, EXTENDED RELEASE ORAL DAILY
Qty: 90 TABLET | Refills: 0 | OUTPATIENT
Start: 2022-09-06

## 2022-09-06 NOTE — TELEPHONE ENCOUNTER
Hypertension Medications Protocol Failed 09/05/2022 09:48 PM   Protocol Details  CMP or BMP in past 12 months    Appointment in past 6 or next 3 months    Last serum creatinine< 2.0        Last office visit:  01/05/22  Last cmp:  08/20/20  BP Readings from Last 3 Encounters:  01/05/22 : 120/82  06/30/21 : 130/80  04/30/21 : 122/82    Bupropion:  05/19/22  #90, no refills  Metoprolol:  05/19/22  #90, no refills      No future appointments.   Sending Sc[facility]-needs appt

## 2022-11-14 DIAGNOSIS — F41.9 ANXIETY: ICD-10-CM

## 2022-11-14 DIAGNOSIS — Z79.899 ENCOUNTER FOR DRUG THERAPY: ICD-10-CM

## 2022-11-14 DIAGNOSIS — R00.0 TACHYCARDIA: ICD-10-CM

## 2022-11-14 DIAGNOSIS — N95.1 PERIMENOPAUSAL: ICD-10-CM

## 2022-11-14 RX ORDER — METOPROLOL SUCCINATE 50 MG/1
TABLET, EXTENDED RELEASE ORAL
Qty: 90 TABLET | Refills: 0 | Status: SHIPPED | OUTPATIENT
Start: 2022-11-14

## 2022-11-14 RX ORDER — BUSPIRONE HYDROCHLORIDE 10 MG/1
10 TABLET ORAL 3 TIMES DAILY
Qty: 180 TABLET | Refills: 0 | Status: SHIPPED | OUTPATIENT
Start: 2022-11-14

## 2022-12-05 RX ORDER — BUPROPION HYDROCHLORIDE 150 MG/1
TABLET ORAL
Qty: 90 TABLET | Refills: 1 | Status: SHIPPED | OUTPATIENT
Start: 2022-12-05

## 2023-01-08 DIAGNOSIS — F41.9 ANXIETY: ICD-10-CM

## 2023-01-08 DIAGNOSIS — Z79.899 ENCOUNTER FOR DRUG THERAPY: ICD-10-CM

## 2023-01-09 RX ORDER — BUSPIRONE HYDROCHLORIDE 10 MG/1
10 TABLET ORAL 3 TIMES DAILY
Qty: 180 TABLET | Refills: 0 | Status: SHIPPED | OUTPATIENT
Start: 2023-01-09

## 2023-02-16 RX ORDER — CLOBETASOL PROPIONATE 0.5 MG/G
OINTMENT TOPICAL
Qty: 60 G | Refills: 1 | OUTPATIENT
Start: 2023-02-16

## 2023-02-16 RX ORDER — CLOBETASOL PROPIONATE 0.5 MG/G
OINTMENT TOPICAL
Qty: 60 G | Refills: 1 | Status: SHIPPED | OUTPATIENT
Start: 2023-02-16

## 2023-02-16 NOTE — TELEPHONE ENCOUNTER
LOV:1-5-22    LAST LAB:8-20-20    LAST RX:  CLOBETASOL 0.05 % External Ointment 60 g 1 2/16/2023     Sig: APPLY EXTERNALLY TO THE AFFECTED AREA TWICE DAILY    Sent to pharmacy as: Clobetasol Propionate 0.05 % External Ointment (Temovate)    E-Prescribing Status: Receipt confirmed by pharmacy (2/16/2023 10:49 AM CST)          Next OV;   Future Appointments   Date Time Provider Sam Millanisti   3/7/2023  4:00 PM Oleg Archer DO [facility] [place]            PROTOCOL:none

## 2023-02-26 DIAGNOSIS — Z79.899 ENCOUNTER FOR DRUG THERAPY: ICD-10-CM

## 2023-02-26 DIAGNOSIS — R00.0 TACHYCARDIA: ICD-10-CM

## 2023-02-26 DIAGNOSIS — N95.1 PERIMENOPAUSAL: ICD-10-CM

## 2023-02-26 DIAGNOSIS — J45.990 EXERCISE-INDUCED ASTHMA: ICD-10-CM

## 2023-02-27 RX ORDER — METOPROLOL SUCCINATE 50 MG/1
TABLET, EXTENDED RELEASE ORAL
Qty: 90 TABLET | Refills: 0 | Status: SHIPPED | OUTPATIENT
Start: 2023-02-27

## 2023-02-27 RX ORDER — ALBUTEROL SULFATE 90 UG/1
AEROSOL, METERED RESPIRATORY (INHALATION)
Qty: 8.5 G | Refills: 0 | Status: SHIPPED | OUTPATIENT
Start: 2023-02-27

## 2023-03-15 DIAGNOSIS — F41.9 ANXIETY: ICD-10-CM

## 2023-03-15 DIAGNOSIS — Z79.899 ENCOUNTER FOR DRUG THERAPY: ICD-10-CM

## 2023-03-15 RX ORDER — BUSPIRONE HYDROCHLORIDE 10 MG/1
10 TABLET ORAL 3 TIMES DAILY
Qty: 180 TABLET | Refills: 0 | Status: SHIPPED | OUTPATIENT
Start: 2023-03-15

## 2023-03-15 NOTE — TELEPHONE ENCOUNTER
Last office visit:  01/05/22  Last refill:  01/09/23  #180, no refills  Last lab:  08/20/20    Future Appointments   Date Time Provider Sam Gonzalez   3/29/2023  9:15 AM Larry Archer DO [facility]

## 2023-03-29 ENCOUNTER — OFFICE VISIT (OUTPATIENT)
Dept: FAMILY MEDICINE CLINIC | Facility: CLINIC | Age: 43
End: 2023-03-29
Payer: COMMERCIAL

## 2023-03-29 ENCOUNTER — TELEPHONE (OUTPATIENT)
Dept: FAMILY MEDICINE CLINIC | Facility: CLINIC | Age: 43
End: 2023-03-29

## 2023-03-29 VITALS
DIASTOLIC BLOOD PRESSURE: 68 MMHG | HEART RATE: 74 BPM | SYSTOLIC BLOOD PRESSURE: 120 MMHG | TEMPERATURE: 99 F | BODY MASS INDEX: 31.31 KG/M2 | WEIGHT: 199.5 LBS | HEIGHT: 67 IN

## 2023-03-29 DIAGNOSIS — E66.1 CLASS 1 DRUG-INDUCED OBESITY WITHOUT SERIOUS COMORBIDITY WITH BODY MASS INDEX (BMI) OF 32.0 TO 32.9 IN ADULT: ICD-10-CM

## 2023-03-29 DIAGNOSIS — J45.990 EXERCISE-INDUCED ASTHMA: ICD-10-CM

## 2023-03-29 DIAGNOSIS — Z23 NEED FOR VACCINATION: ICD-10-CM

## 2023-03-29 DIAGNOSIS — F41.9 ANXIETY: ICD-10-CM

## 2023-03-29 DIAGNOSIS — R00.0 TACHYCARDIA: ICD-10-CM

## 2023-03-29 DIAGNOSIS — F50.81 BINGE EATING DISORDER: ICD-10-CM

## 2023-03-29 DIAGNOSIS — Z00.00 ROUTINE ADULT HEALTH MAINTENANCE: Primary | ICD-10-CM

## 2023-03-29 DIAGNOSIS — J30.1 SEASONAL ALLERGIC RHINITIS DUE TO POLLEN: ICD-10-CM

## 2023-03-29 PROBLEM — F50.819 BINGE EATING DISORDER: Status: ACTIVE | Noted: 2023-03-29

## 2023-03-29 PROCEDURE — 90471 IMMUNIZATION ADMIN: CPT | Performed by: FAMILY MEDICINE

## 2023-03-29 PROCEDURE — 99396 PREV VISIT EST AGE 40-64: CPT | Performed by: FAMILY MEDICINE

## 2023-03-29 PROCEDURE — 3074F SYST BP LT 130 MM HG: CPT | Performed by: FAMILY MEDICINE

## 2023-03-29 PROCEDURE — 90715 TDAP VACCINE 7 YRS/> IM: CPT | Performed by: FAMILY MEDICINE

## 2023-03-29 PROCEDURE — 90472 IMMUNIZATION ADMIN EACH ADD: CPT | Performed by: FAMILY MEDICINE

## 2023-03-29 PROCEDURE — 3078F DIAST BP <80 MM HG: CPT | Performed by: FAMILY MEDICINE

## 2023-03-29 PROCEDURE — 3008F BODY MASS INDEX DOCD: CPT | Performed by: FAMILY MEDICINE

## 2023-04-03 ENCOUNTER — PATIENT MESSAGE (OUTPATIENT)
Dept: FAMILY MEDICINE CLINIC | Facility: CLINIC | Age: 43
End: 2023-04-03

## 2023-04-03 DIAGNOSIS — E66.09 CLASS 1 OBESITY DUE TO EXCESS CALORIES WITHOUT SERIOUS COMORBIDITY WITH BODY MASS INDEX (BMI) OF 31.0 TO 31.9 IN ADULT: Primary | ICD-10-CM

## 2023-04-03 DIAGNOSIS — E66.1 CLASS 1 DRUG-INDUCED OBESITY WITHOUT SERIOUS COMORBIDITY WITH BODY MASS INDEX (BMI) OF 32.0 TO 32.9 IN ADULT: ICD-10-CM

## 2023-04-03 DIAGNOSIS — F50.81 BINGE EATING DISORDER: ICD-10-CM

## 2023-04-03 NOTE — TELEPHONE ENCOUNTER
Your insurance needs a prior quth. I placed an order and will wait for the prior auth. Criteria needs to be met for this to be approved. Good RX is around $1000-900 per pen cash.   So cost prohibiitve if insurnace doesn't pay

## 2023-04-03 NOTE — TELEPHONE ENCOUNTER
From: Smiley Watts  To: Charles Balderas DO  Sent: 4/3/2023 8:08 AM CDT  Subject: Follow up question     I have looked into and am a little more interested in ozempic. I was wondering if you could see if insurance would cover and what are the next steps. Thank you.    Asha Rangel

## 2023-04-04 PROBLEM — E66.9 CLASS 1 OBESITY WITH BODY MASS INDEX (BMI) OF 31.0 TO 31.9 IN ADULT: Status: ACTIVE | Noted: 2023-04-04

## 2023-04-04 PROBLEM — E66.811 CLASS 1 OBESITY WITH BODY MASS INDEX (BMI) OF 31.0 TO 31.9 IN ADULT: Status: ACTIVE | Noted: 2023-04-04

## 2023-04-04 NOTE — TELEPHONE ENCOUNTER
Wegovy copay for pt is over $1500. Per pt's request, vyvanse to be reordered and will restart PA process. Routed med to DS to sign. Dr. Cheko Carlson, pls sign vyvanse order to reinitiate PA.

## 2023-04-04 NOTE — TELEPHONE ENCOUNTER
PA initiated electronically through [employer], awaiting determination. PA approved. Pt notified.  Pt's copay $75.

## 2023-04-10 DIAGNOSIS — F41.9 ANXIETY: ICD-10-CM

## 2023-05-02 ENCOUNTER — PATIENT MESSAGE (OUTPATIENT)
Dept: FAMILY MEDICINE CLINIC | Facility: CLINIC | Age: 43
End: 2023-05-02

## 2023-05-02 DIAGNOSIS — E66.1 CLASS 1 DRUG-INDUCED OBESITY WITHOUT SERIOUS COMORBIDITY WITH BODY MASS INDEX (BMI) OF 32.0 TO 32.9 IN ADULT: ICD-10-CM

## 2023-05-02 DIAGNOSIS — F41.9 ANXIETY: ICD-10-CM

## 2023-05-02 DIAGNOSIS — F50.81 BINGE EATING DISORDER: ICD-10-CM

## 2023-05-02 DIAGNOSIS — E66.09 CLASS 1 OBESITY DUE TO EXCESS CALORIES WITHOUT SERIOUS COMORBIDITY WITH BODY MASS INDEX (BMI) OF 31.0 TO 31.9 IN ADULT: ICD-10-CM

## 2023-05-02 DIAGNOSIS — Z79.899 ENCOUNTER FOR DRUG THERAPY: ICD-10-CM

## 2023-05-02 RX ORDER — BUSPIRONE HYDROCHLORIDE 10 MG/1
TABLET ORAL
Qty: 180 TABLET | Refills: 0 | Status: SHIPPED | OUTPATIENT
Start: 2023-05-02

## 2023-05-02 RX ORDER — ALPRAZOLAM 0.25 MG/1
TABLET ORAL
Qty: 30 TABLET | Refills: 0 | Status: SHIPPED | OUTPATIENT
Start: 2023-05-02

## 2023-05-02 NOTE — TELEPHONE ENCOUNTER
Labs printed and faxed to [facility] Scheduling @ [phone]

## 2023-05-18 LAB
ABSOLUTE BASOPHILS: 36 CELLS/UL (ref 0–200)
ABSOLUTE EOSINOPHILS: 182 CELLS/UL (ref 15–500)
ABSOLUTE LYMPHOCYTES: 2330 CELLS/UL (ref 850–3900)
ABSOLUTE MONOCYTES: 610 CELLS/UL (ref 200–950)
ABSOLUTE NEUTROPHILS: 5942 CELLS/UL (ref 1500–7800)
ALBUMIN/GLOBULIN RATIO: 1.6 (CALC) (ref 1–2.5)
ALBUMIN: 4.4 G/DL (ref 3.6–5.1)
ALKALINE PHOSPHATASE: 47 U/L (ref 31–125)
ALT: 13 U/L (ref 6–29)
AST: 21 U/L (ref 10–30)
BASOPHILS: 0.4 %
BILIRUBIN, TOTAL: 0.5 MG/DL (ref 0.2–1.2)
BUN: 14 MG/DL (ref 7–25)
CALCIUM: 9.3 MG/DL (ref 8.6–10.2)
CARBON DIOXIDE: 25 MMOL/L (ref 20–32)
CHLORIDE: 101 MMOL/L (ref 98–110)
CHOL/HDLC RATIO: 2.7 (CALC)
CHOLESTEROL, TOTAL: 195 MG/DL
CREATININE: 0.84 MG/DL (ref 0.5–0.99)
EGFR: 90 ML/MIN/1.73M2
EOSINOPHILS: 2 %
GLOBULIN: 2.7 G/DL (CALC) (ref 1.9–3.7)
GLUCOSE: 76 MG/DL (ref 65–139)
HDL CHOLESTEROL: 73 MG/DL
HEMATOCRIT: 41.5 % (ref 35–45)
HEMOGLOBIN: 14.1 G/DL (ref 11.7–15.5)
LDL-CHOLESTEROL: 100 MG/DL (CALC)
LYMPHOCYTES: 25.6 %
MCH: 32 PG (ref 27–33)
MCHC: 34 G/DL (ref 32–36)
MCV: 94.1 FL (ref 80–100)
MONOCYTES: 6.7 %
MPV: 9.6 FL (ref 7.5–12.5)
NEUTROPHILS: 65.3 %
NON-HDL CHOLESTEROL: 122 MG/DL (CALC)
PLATELET COUNT: 298 THOUSAND/UL (ref 140–400)
POTASSIUM: 4.2 MMOL/L (ref 3.5–5.3)
PROTEIN, TOTAL: 7.1 G/DL (ref 6.1–8.1)
RDW: 11.9 % (ref 11–15)
RED BLOOD CELL COUNT: 4.41 MILLION/UL (ref 3.8–5.1)
SODIUM: 138 MMOL/L (ref 135–146)
TRIGLYCERIDES: 127 MG/DL
TSH W/REFLEX TO FT4: 1.05 MIU/L
WHITE BLOOD CELL COUNT: 9.1 THOUSAND/UL (ref 3.8–10.8)

## 2023-05-19 ENCOUNTER — PATIENT MESSAGE (OUTPATIENT)
Dept: FAMILY MEDICINE CLINIC | Facility: CLINIC | Age: 43
End: 2023-05-19

## 2023-05-19 DIAGNOSIS — L30.9 DERMATITIS: Primary | ICD-10-CM

## 2023-05-19 RX ORDER — PREDNISONE 20 MG/1
20 TABLET ORAL DAILY
Qty: 5 TABLET | Refills: 0 | Status: SHIPPED | OUTPATIENT
Start: 2023-05-19 | End: 2023-05-24

## 2023-05-31 DIAGNOSIS — N95.1 PERIMENOPAUSAL: ICD-10-CM

## 2023-05-31 DIAGNOSIS — E66.1 CLASS 1 DRUG-INDUCED OBESITY WITHOUT SERIOUS COMORBIDITY WITH BODY MASS INDEX (BMI) OF 32.0 TO 32.9 IN ADULT: ICD-10-CM

## 2023-05-31 DIAGNOSIS — Z79.899 ENCOUNTER FOR DRUG THERAPY: ICD-10-CM

## 2023-05-31 DIAGNOSIS — F50.81 BINGE EATING DISORDER: ICD-10-CM

## 2023-05-31 DIAGNOSIS — E66.09 CLASS 1 OBESITY DUE TO EXCESS CALORIES WITHOUT SERIOUS COMORBIDITY WITH BODY MASS INDEX (BMI) OF 31.0 TO 31.9 IN ADULT: ICD-10-CM

## 2023-05-31 DIAGNOSIS — R00.0 TACHYCARDIA: ICD-10-CM

## 2023-06-01 RX ORDER — BUPROPION HYDROCHLORIDE 150 MG/1
TABLET ORAL
Qty: 90 TABLET | Refills: 1 | Status: SHIPPED | OUTPATIENT
Start: 2023-06-01

## 2023-06-01 RX ORDER — METOPROLOL SUCCINATE 50 MG/1
TABLET, EXTENDED RELEASE ORAL
Qty: 90 TABLET | Refills: 0 | Status: SHIPPED | OUTPATIENT
Start: 2023-06-01

## 2023-07-02 DIAGNOSIS — Z79.899 ENCOUNTER FOR DRUG THERAPY: ICD-10-CM

## 2023-07-02 DIAGNOSIS — F50.81 BINGE EATING DISORDER: ICD-10-CM

## 2023-07-02 DIAGNOSIS — F41.9 ANXIETY: ICD-10-CM

## 2023-07-02 DIAGNOSIS — E66.09 CLASS 1 OBESITY DUE TO EXCESS CALORIES WITHOUT SERIOUS COMORBIDITY WITH BODY MASS INDEX (BMI) OF 31.0 TO 31.9 IN ADULT: ICD-10-CM

## 2023-07-02 DIAGNOSIS — E66.1 CLASS 1 DRUG-INDUCED OBESITY WITHOUT SERIOUS COMORBIDITY WITH BODY MASS INDEX (BMI) OF 32.0 TO 32.9 IN ADULT: ICD-10-CM

## 2023-07-03 RX ORDER — BUSPIRONE HYDROCHLORIDE 10 MG/1
10 TABLET ORAL 3 TIMES DAILY
Qty: 180 TABLET | Refills: 0 | Status: SHIPPED | OUTPATIENT
Start: 2023-07-03

## 2023-07-03 NOTE — TELEPHONE ENCOUNTER
lisdexamfetamine 30 MG Oral Cap     Last office visit:  3/29/23    No future appointments.   Last filled:  6/1/23  #30 with 0 refills   Last labs:  5/17/23

## 2023-07-05 DIAGNOSIS — F50.81 BINGE EATING DISORDER: ICD-10-CM

## 2023-07-05 DIAGNOSIS — E66.1 CLASS 1 DRUG-INDUCED OBESITY WITHOUT SERIOUS COMORBIDITY WITH BODY MASS INDEX (BMI) OF 32.0 TO 32.9 IN ADULT: ICD-10-CM

## 2023-07-05 DIAGNOSIS — E66.09 CLASS 1 OBESITY DUE TO EXCESS CALORIES WITHOUT SERIOUS COMORBIDITY WITH BODY MASS INDEX (BMI) OF 31.0 TO 31.9 IN ADULT: ICD-10-CM

## 2023-07-06 NOTE — TELEPHONE ENCOUNTER
lisdexamfetamine 30 MG Oral Cap     Was refilled 7/5/23  sent to [facility] in [place] on [address]

## 2023-07-30 DIAGNOSIS — E66.09 CLASS 1 OBESITY DUE TO EXCESS CALORIES WITHOUT SERIOUS COMORBIDITY WITH BODY MASS INDEX (BMI) OF 31.0 TO 31.9 IN ADULT: ICD-10-CM

## 2023-07-30 DIAGNOSIS — F50.81 BINGE EATING DISORDER: ICD-10-CM

## 2023-07-30 DIAGNOSIS — E66.1 CLASS 1 DRUG-INDUCED OBESITY WITHOUT SERIOUS COMORBIDITY WITH BODY MASS INDEX (BMI) OF 32.0 TO 32.9 IN ADULT: ICD-10-CM

## 2023-07-31 NOTE — TELEPHONE ENCOUNTER
lisdexamfetamine 30 MG Oral Cap     Last office visit:  3/29/23    No future appointments.   Last filled:  7/5/23  #30 with 0 refills   Last labs:   5/17/23

## 2023-08-30 DIAGNOSIS — Z79.899 ENCOUNTER FOR DRUG THERAPY: ICD-10-CM

## 2023-08-30 DIAGNOSIS — R00.0 TACHYCARDIA: ICD-10-CM

## 2023-08-30 DIAGNOSIS — F41.9 ANXIETY: ICD-10-CM

## 2023-08-30 DIAGNOSIS — N95.1 PERIMENOPAUSAL: ICD-10-CM

## 2023-08-31 DIAGNOSIS — F50.81 BINGE EATING DISORDER: ICD-10-CM

## 2023-08-31 DIAGNOSIS — E66.1 CLASS 1 DRUG-INDUCED OBESITY WITHOUT SERIOUS COMORBIDITY WITH BODY MASS INDEX (BMI) OF 32.0 TO 32.9 IN ADULT: ICD-10-CM

## 2023-08-31 DIAGNOSIS — E66.09 CLASS 1 OBESITY DUE TO EXCESS CALORIES WITHOUT SERIOUS COMORBIDITY WITH BODY MASS INDEX (BMI) OF 31.0 TO 31.9 IN ADULT: ICD-10-CM

## 2023-08-31 RX ORDER — METOPROLOL SUCCINATE 50 MG/1
50 TABLET, EXTENDED RELEASE ORAL DAILY
Qty: 90 TABLET | Refills: 0 | Status: SHIPPED | OUTPATIENT
Start: 2023-08-31

## 2023-08-31 RX ORDER — BUSPIRONE HYDROCHLORIDE 10 MG/1
10 TABLET ORAL 3 TIMES DAILY
Qty: 180 TABLET | Refills: 0 | Status: SHIPPED | OUTPATIENT
Start: 2023-08-31

## 2023-10-04 DIAGNOSIS — F50.81 BINGE EATING DISORDER: ICD-10-CM

## 2023-10-04 DIAGNOSIS — E66.09 CLASS 1 OBESITY DUE TO EXCESS CALORIES WITHOUT SERIOUS COMORBIDITY WITH BODY MASS INDEX (BMI) OF 31.0 TO 31.9 IN ADULT: ICD-10-CM

## 2023-10-04 DIAGNOSIS — E66.1 CLASS 1 DRUG-INDUCED OBESITY WITHOUT SERIOUS COMORBIDITY WITH BODY MASS INDEX (BMI) OF 32.0 TO 32.9 IN ADULT: ICD-10-CM

## 2023-10-04 RX ORDER — LISDEXAMFETAMINE DIMESYLATE CAPSULES 30 MG/1
30 CAPSULE ORAL EVERY MORNING
Qty: 30 CAPSULE | Refills: 0 | OUTPATIENT
Start: 2023-10-04

## 2023-11-01 ENCOUNTER — TELEPHONE (OUTPATIENT)
Dept: FAMILY MEDICINE CLINIC | Facility: CLINIC | Age: 43
End: 2023-11-01

## 2023-11-01 DIAGNOSIS — E66.09 CLASS 1 OBESITY DUE TO EXCESS CALORIES WITHOUT SERIOUS COMORBIDITY WITH BODY MASS INDEX (BMI) OF 31.0 TO 31.9 IN ADULT: ICD-10-CM

## 2023-11-01 DIAGNOSIS — F50.81 BINGE EATING DISORDER: ICD-10-CM

## 2023-11-01 DIAGNOSIS — F41.9 ANXIETY: ICD-10-CM

## 2023-11-01 DIAGNOSIS — E66.1 CLASS 1 DRUG-INDUCED OBESITY WITHOUT SERIOUS COMORBIDITY WITH BODY MASS INDEX (BMI) OF 32.0 TO 32.9 IN ADULT: ICD-10-CM

## 2023-11-01 DIAGNOSIS — Z79.899 ENCOUNTER FOR DRUG THERAPY: ICD-10-CM

## 2023-11-01 RX ORDER — ALPRAZOLAM 0.25 MG/1
0.25 TABLET ORAL EVERY 4 HOURS PRN
Qty: 30 TABLET | Refills: 0 | Status: SHIPPED | OUTPATIENT
Start: 2023-11-01

## 2023-11-01 RX ORDER — LISDEXAMFETAMINE DIMESYLATE CAPSULES 30 MG/1
30 CAPSULE ORAL EVERY MORNING
Qty: 30 CAPSULE | Refills: 0 | OUTPATIENT
Start: 2023-11-01

## 2023-11-01 RX ORDER — BUSPIRONE HYDROCHLORIDE 10 MG/1
10 TABLET ORAL 3 TIMES DAILY
Qty: 180 TABLET | Refills: 0 | Status: SHIPPED | OUTPATIENT
Start: 2023-11-01

## 2023-11-01 NOTE — TELEPHONE ENCOUNTER
Last filled 8/31/2023 #180 for Buspirone    Last alprazolam filled on 5/2/2023  #30    Last seen in office 3/29/2023    No future appointments.

## 2023-11-03 DIAGNOSIS — F50.81 BINGE EATING DISORDER: ICD-10-CM

## 2023-11-03 RX ORDER — LISDEXAMFETAMINE DIMESYLATE CAPSULES 30 MG/1
30 CAPSULE ORAL DAILY
Qty: 30 CAPSULE | Refills: 0 | Status: SHIPPED | OUTPATIENT
Start: 2023-11-03 | End: 2023-12-03

## 2023-11-03 NOTE — TELEPHONE ENCOUNTER
Adore Bauer from [facility] called. Please resend script for lisdexamfetamine (VYVANSE) 30 MG Oral Cap  (dated 11-1). There system crashed.   --911 [address] [place]

## 2023-12-01 DIAGNOSIS — R00.0 TACHYCARDIA: ICD-10-CM

## 2023-12-01 DIAGNOSIS — Z79.899 ENCOUNTER FOR DRUG THERAPY: ICD-10-CM

## 2023-12-01 DIAGNOSIS — N95.1 PERIMENOPAUSAL: ICD-10-CM

## 2023-12-01 RX ORDER — BUPROPION HYDROCHLORIDE 150 MG/1
150 TABLET ORAL DAILY
Qty: 90 TABLET | Refills: 1 | Status: SHIPPED | OUTPATIENT
Start: 2023-12-01

## 2023-12-01 RX ORDER — METOPROLOL SUCCINATE 50 MG/1
50 TABLET, EXTENDED RELEASE ORAL DAILY
Qty: 90 TABLET | Refills: 0 | Status: SHIPPED | OUTPATIENT
Start: 2023-12-01

## 2023-12-01 NOTE — TELEPHONE ENCOUNTER
Last office visit:  03/29/23  Last cmp:  05/18/23    BP Readings from Last 3 Encounters:   03/29/23 120/68   01/05/22 120/82   06/30/21 130/80     Bupropion:  06/01/23  #90, 1 refill  Metoprolol:  08/31/23  #90, no refills      No future appointments.

## 2023-12-07 DIAGNOSIS — E66.1 CLASS 1 DRUG-INDUCED OBESITY WITHOUT SERIOUS COMORBIDITY WITH BODY MASS INDEX (BMI) OF 32.0 TO 32.9 IN ADULT: ICD-10-CM

## 2023-12-07 DIAGNOSIS — E66.09 CLASS 1 OBESITY DUE TO EXCESS CALORIES WITHOUT SERIOUS COMORBIDITY WITH BODY MASS INDEX (BMI) OF 31.0 TO 31.9 IN ADULT: ICD-10-CM

## 2023-12-07 DIAGNOSIS — F50.81 BINGE EATING DISORDER: ICD-10-CM

## 2023-12-07 RX ORDER — LISDEXAMFETAMINE DIMESYLATE CAPSULES 30 MG/1
30 CAPSULE ORAL EVERY MORNING
Qty: 30 CAPSULE | Refills: 0 | Status: SHIPPED | OUTPATIENT
Start: 2023-12-07

## 2023-12-29 DIAGNOSIS — J45.990 EXERCISE-INDUCED ASTHMA: ICD-10-CM

## 2023-12-29 DIAGNOSIS — F41.9 ANXIETY: ICD-10-CM

## 2023-12-29 DIAGNOSIS — E66.1 CLASS 1 DRUG-INDUCED OBESITY WITHOUT SERIOUS COMORBIDITY WITH BODY MASS INDEX (BMI) OF 32.0 TO 32.9 IN ADULT: ICD-10-CM

## 2023-12-29 DIAGNOSIS — F50.81 BINGE EATING DISORDER: ICD-10-CM

## 2023-12-29 DIAGNOSIS — E66.09 CLASS 1 OBESITY DUE TO EXCESS CALORIES WITHOUT SERIOUS COMORBIDITY WITH BODY MASS INDEX (BMI) OF 31.0 TO 31.9 IN ADULT: ICD-10-CM

## 2023-12-29 RX ORDER — ALBUTEROL SULFATE 2.5 MG/3ML
2.5 SOLUTION RESPIRATORY (INHALATION) EVERY 4 HOURS PRN
Qty: 50 EACH | Refills: 3 | Status: SHIPPED | OUTPATIENT
Start: 2023-12-29

## 2023-12-29 RX ORDER — LISDEXAMFETAMINE DIMESYLATE CAPSULES 30 MG/1
30 CAPSULE ORAL EVERY MORNING
Qty: 30 CAPSULE | Refills: 0 | Status: SHIPPED | OUTPATIENT
Start: 2023-12-29

## 2023-12-29 RX ORDER — ALBUTEROL SULFATE 90 UG/1
2 AEROSOL, METERED RESPIRATORY (INHALATION) EVERY 4 HOURS PRN
Qty: 8.5 G | Refills: 0 | Status: CANCELLED | OUTPATIENT
Start: 2023-12-29

## 2023-12-29 RX ORDER — ALBUTEROL SULFATE 90 UG/1
2 AEROSOL, METERED RESPIRATORY (INHALATION) EVERY 4 HOURS PRN
Qty: 8.5 G | Refills: 0 | Status: SHIPPED | OUTPATIENT
Start: 2023-12-29

## 2023-12-29 NOTE — TELEPHONE ENCOUNTER
Last refills -   Albuterol - 2/27/23 - 8.5g  Lisdexamfetamine - 12/7/23 - #30  Sertraline - 4/10/23 - 90 with 1 refill  Last office visit - 3/29/23

## 2024-01-02 DIAGNOSIS — F41.9 ANXIETY: ICD-10-CM

## 2024-01-02 DIAGNOSIS — Z79.899 ENCOUNTER FOR DRUG THERAPY: ICD-10-CM

## 2024-01-03 RX ORDER — BUSPIRONE HYDROCHLORIDE 10 MG/1
10 TABLET ORAL 3 TIMES DAILY
Qty: 180 TABLET | Refills: 0 | Status: SHIPPED | OUTPATIENT
Start: 2024-01-03

## 2024-01-08 ENCOUNTER — PATIENT MESSAGE (OUTPATIENT)
Dept: FAMILY MEDICINE CLINIC | Facility: CLINIC | Age: 44
End: 2024-01-08

## 2024-01-08 DIAGNOSIS — E66.09 CLASS 1 OBESITY DUE TO EXCESS CALORIES WITHOUT SERIOUS COMORBIDITY WITH BODY MASS INDEX (BMI) OF 31.0 TO 31.9 IN ADULT: ICD-10-CM

## 2024-01-08 DIAGNOSIS — E66.1 CLASS 1 DRUG-INDUCED OBESITY WITHOUT SERIOUS COMORBIDITY WITH BODY MASS INDEX (BMI) OF 32.0 TO 32.9 IN ADULT: ICD-10-CM

## 2024-01-08 DIAGNOSIS — F50.81 BINGE EATING DISORDER: ICD-10-CM

## 2024-01-08 NOTE — TELEPHONE ENCOUNTER
From: Nara Phelps  To: ADEBAYO Holleya Gianni  Sent: 1/8/2024 12:57 PM CST  Subject: Vyvanse backorder    Hi- my pharmacy said they cannot get vyvanse 30mg or generic because it is back ordered and I am almost out. Is there another option or increase dosage? It’s working but I feel not as well as it was when I first started taking it. I do not notice side effects from it but really don’t want to try something totally new either. Just wondering what I should do. Thanks

## 2024-01-09 RX ORDER — LISDEXAMFETAMINE DIMESYLATE CAPSULES 40 MG/1
40 CAPSULE ORAL EVERY MORNING
Qty: 30 CAPSULE | Refills: 0 | Status: SHIPPED | OUTPATIENT
Start: 2024-01-09 | End: 2024-02-08

## 2024-01-19 NOTE — TELEPHONE ENCOUNTER
January 19, 2024        Yovani Ortiz  [address] [address]        Dear Yovani,    Our records show that you are due for the following preventive tests(s). If you have had this testing done, please call us so we can update your record.    Fecal Occult Blood Test every year: We offer a one-sample Fecal Occult Blood Test. This simple test checks a stool sample for hidden blood, which can be a sign of cancer. If hidden blood is detected at the time of the examination, a follow-up colonoscopy is recommended.      Once you have completed the test put your name and date of birth on the outside of envelope then drop it off to our office.  We will have results in 7 to 10 days.    Your good health is important to us. Please feel free to call my office at Dept: [phone] with any questions.    Sincerely,        MARGAUX Elder MD  [address]  [facility] [address] [phone]   Last OV 12/13/16 with Dr. Rachna Ortiz  Last ordered 1/3/18 x 6 months

## 2024-02-05 DIAGNOSIS — E66.09 CLASS 1 OBESITY DUE TO EXCESS CALORIES WITHOUT SERIOUS COMORBIDITY WITH BODY MASS INDEX (BMI) OF 31.0 TO 31.9 IN ADULT: ICD-10-CM

## 2024-02-05 DIAGNOSIS — E66.1 CLASS 1 DRUG-INDUCED OBESITY WITHOUT SERIOUS COMORBIDITY WITH BODY MASS INDEX (BMI) OF 32.0 TO 32.9 IN ADULT: ICD-10-CM

## 2024-02-05 DIAGNOSIS — F50.81 BINGE EATING DISORDER: ICD-10-CM

## 2024-02-06 RX ORDER — LISDEXAMFETAMINE DIMESYLATE CAPSULES 40 MG/1
40 CAPSULE ORAL EVERY MORNING
Qty: 30 CAPSULE | Refills: 0 | Status: SHIPPED | OUTPATIENT
Start: 2024-02-06 | End: 2024-03-07

## 2024-02-06 NOTE — TELEPHONE ENCOUNTER
lisdexamfetamine 40 MG Oral Cap   Last office visit:  3/29/23    No future appointments.  Last filled:  1/9/24  #30 with 0 refills   Last labs:  5/17/23

## 2024-03-01 DIAGNOSIS — Z79.899 ENCOUNTER FOR DRUG THERAPY: ICD-10-CM

## 2024-03-01 DIAGNOSIS — R00.0 TACHYCARDIA: ICD-10-CM

## 2024-03-01 DIAGNOSIS — N95.1 PERIMENOPAUSAL: ICD-10-CM

## 2024-03-02 RX ORDER — METOPROLOL SUCCINATE 50 MG/1
50 TABLET, EXTENDED RELEASE ORAL DAILY
Qty: 90 TABLET | Refills: 0 | Status: SHIPPED | OUTPATIENT
Start: 2024-03-02

## 2024-03-02 NOTE — TELEPHONE ENCOUNTER
METOPROLOL SUCCINATE ER 50 MG Oral Tablet 24 Hr     Hypertension Medications Protocol [id number]/01/2024 12:20 AM   Protocol Details CMP or BMP in past 12 months    Last BP reading less than 140/90    In person appointment or virtual visit in the past 12 mos or appointment in next 3 mos    EGFRCR or GFRNAA > 50      Last office visit:  3/29/23    No future appointments.  Last filled:  12/1/23  #90 with 0 refills   Last labs:  5/17/23  GFR:  90  Last BP:  120/68

## 2024-03-05 DIAGNOSIS — F50.81 BINGE EATING DISORDER: ICD-10-CM

## 2024-03-05 DIAGNOSIS — Z79.899 ENCOUNTER FOR DRUG THERAPY: ICD-10-CM

## 2024-03-05 DIAGNOSIS — E66.09 CLASS 1 OBESITY DUE TO EXCESS CALORIES WITHOUT SERIOUS COMORBIDITY WITH BODY MASS INDEX (BMI) OF 31.0 TO 31.9 IN ADULT: ICD-10-CM

## 2024-03-05 DIAGNOSIS — E66.1 CLASS 1 DRUG-INDUCED OBESITY WITHOUT SERIOUS COMORBIDITY WITH BODY MASS INDEX (BMI) OF 32.0 TO 32.9 IN ADULT: ICD-10-CM

## 2024-03-05 DIAGNOSIS — F41.9 ANXIETY: ICD-10-CM

## 2024-03-05 RX ORDER — LISDEXAMFETAMINE DIMESYLATE CAPSULES 40 MG/1
40 CAPSULE ORAL EVERY MORNING
Qty: 30 CAPSULE | Refills: 0 | Status: SHIPPED | OUTPATIENT
Start: 2024-03-05 | End: 2024-04-04

## 2024-03-05 RX ORDER — BUSPIRONE HYDROCHLORIDE 10 MG/1
10 TABLET ORAL 3 TIMES DAILY
Qty: 180 TABLET | Refills: 0 | Status: SHIPPED | OUTPATIENT
Start: 2024-03-05

## 2024-03-05 NOTE — TELEPHONE ENCOUNTER
No protocol for either medication.    Last office visit:  03/29/23  Last lab:  05/18/23  Vyvanse:  02/06/24  #30, no refills  Buspirone:  01/03/24  #180, no refills    No future appointments.  Sending mychart-due for a complete physical by the end of the month w/ fasting labs and pap.

## 2024-03-06 ENCOUNTER — MED REC SCAN ONLY (OUTPATIENT)
Dept: FAMILY MEDICINE CLINIC | Facility: CLINIC | Age: 44
End: 2024-03-06

## 2024-03-06 DIAGNOSIS — J45.990 EXERCISE-INDUCED ASTHMA (HCC): ICD-10-CM

## 2024-03-06 RX ORDER — ALBUTEROL SULFATE 90 UG/1
2 AEROSOL, METERED RESPIRATORY (INHALATION) EVERY 4 HOURS PRN
Qty: 8.5 G | Refills: 0 | Status: SHIPPED | OUTPATIENT
Start: 2024-03-06

## 2024-03-06 NOTE — TELEPHONE ENCOUNTER
Last office visit: 3/29/23  Last refill: 12/29/23  Future Appointments   Date Time Provider Department Center   4/10/2024  8:30 AM ADEBAYO Archer DO [facility]      Name from pharmacy: ALBUTEROL HFA INH (200 PUFFS) 8.5GM         Will file in chart as: ALBUTEROL 108 (90 Base) MCG/ACT Inhalation Aero Soln    Sig: INHALE 2 PUFFS INTO THE LUNGS EVERY 4 HOURS AS NEEDED FOR WHEEZING    Disp: 8.5 g    Refills: 0 (Pharmacy requested: Not specified)    Start: 3/6/2024    Class: Normal    For: Exercise-induced asthma (HCC)    Last ordered: 2 months ago (12/29/2023) by ADEBAYO Archer DO    Last refill: 12/29/2023    Rx #: [id number]    Asthma & COPD Medication Protocol Agowaf1803/06/2024 06:43 AM   Protocol Details Asthma Action Score greater than or equal to 20    Appointment in past 6 or next 3 months    AAP/ACT given in last 12 months      To be filled at: [id number] - [place], [place] - [address] AT [facility] [address], [phone], [phone]

## 2024-03-07 ENCOUNTER — TELEPHONE (OUTPATIENT)
Dept: FAMILY MEDICINE CLINIC | Facility: CLINIC | Age: 44
End: 2024-03-07

## 2024-03-07 DIAGNOSIS — F50.81 BINGE EATING DISORDER: ICD-10-CM

## 2024-03-07 DIAGNOSIS — E66.1 CLASS 1 DRUG-INDUCED OBESITY WITHOUT SERIOUS COMORBIDITY WITH BODY MASS INDEX (BMI) OF 32.0 TO 32.9 IN ADULT: ICD-10-CM

## 2024-03-07 DIAGNOSIS — E66.09 CLASS 1 OBESITY DUE TO EXCESS CALORIES WITHOUT SERIOUS COMORBIDITY WITH BODY MASS INDEX (BMI) OF 31.0 TO 31.9 IN ADULT: ICD-10-CM

## 2024-03-07 RX ORDER — LISDEXAMFETAMINE DIMESYLATE CAPSULES 40 MG/1
40 CAPSULE ORAL EVERY MORNING
Qty: 30 CAPSULE | Refills: 0 | Status: CANCELLED | OUTPATIENT
Start: 2024-03-07 | End: 2024-04-06

## 2024-03-07 NOTE — TELEPHONE ENCOUNTER
ASKING FOR GENERIC SCRIPT FOR VYVANSE 40 MG.     [facility] IN [place] [facility].

## 2024-04-27 RX ORDER — CLOBETASOL PROPIONATE 0.5 MG/G
1 OINTMENT TOPICAL 2 TIMES DAILY
COMMUNITY
Start: 2024-02-06

## 2024-04-27 NOTE — H&P
HPI:   Nara Phelps is a 43 year old female who presents for a complete physical exam. Symptoms: periods are regular. Patient complains of needing physical.  Occ takes half hour to sleep. Hot flashes are bad.  Does eat 12- 6 as her eating window. Only keto -  run 4 miles 4 days a week. [place] and ab work up.  Has hot flashes.  3-4  times at night.  Drinks water.  Frustated with her weight gain and not losing anything will all her efforts.    Immunization History   Administered Date(s) Administered    Covid-19 Vaccine Moderna 100 mcg/0.5 ml 01/21/2021    DT 06/26/1996    DTAP INFANRIX 10/12/1982, 12/13/1982, 02/15/1983, 02/21/1984, 07/28/1987    DTP 10/12/1982, 12/13/1982, 02/15/1983, 02/21/1984, 07/28/1987    HEP B, Ped/Adol 09/19/1994, 10/19/1994, 03/22/1995    Influenza 10/18/2016, 10/01/2018, 10/14/2020    MMR 02/21/1984, 04/13/1992    OPV 10/12/1982, 10/12/1982, 12/13/1982, 12/13/1982, 02/21/1984, 02/21/1984, 07/28/1987, 07/28/1987    TDAP 03/29/2023     Wt Readings from Last 6 Encounters:   04/29/24 193 lb 2 oz (87.6 kg)   03/29/23 199 lb 8 oz (90.5 kg)   01/05/22 188 lb (85.3 kg)   06/30/21 179 lb (81.2 kg)   04/30/21 190 lb 6.4 oz (86.4 kg)   12/15/20 177 lb (80.3 kg)     Body mass index is 30.25 kg/m².     Lab Results   Component Value Date    GLU 76 05/17/2023    GLU 73 08/20/2020     Lab Results   Component Value Date    CHOLEST 195 05/17/2023     Lab Results   Component Value Date    HDL 73 05/17/2023     Lab Results   Component Value Date     (H) 05/17/2023     Lab Results   Component Value Date    AST 21 05/17/2023    AST 21 08/20/2020     Lab Results   Component Value Date    ALT 13 05/17/2023    ALT 12 08/20/2020       Current Outpatient Medications   Medication Sig Dispense Refill    cloNIDine 0.1 MG Oral Tab Take 1 tablet (0.1 mg total) by mouth nightly. 90 tablet 0    clobetasol 0.05 % External Ointment Apply 1 Application topically 2 (two) times daily. APPLY TO AFFECTED AREA       lisdexamfetamine 40 MG Oral Cap Take 1 capsule (40 mg total) by mouth every morning. 30 capsule 0    ALBUTEROL 108 (90 Base) MCG/ACT Inhalation Aero Soln INHALE 2 PUFFS INTO THE LUNGS EVERY 4 HOURS AS NEEDED FOR WHEEZING 8.5 g 0    busPIRone 10 MG Oral Tab Take 1 tablet (10 mg total) by mouth 3 (three) times daily. 180 tablet 0    METOPROLOL SUCCINATE ER 50 MG Oral Tablet 24 Hr TAKE 1 TABLET(50 MG) BY MOUTH DAILY 90 tablet 0    albuterol (2.5 MG/3ML) 0.083% Inhalation Nebu Soln Take 3 mL (2.5 mg total) by nebulization every 4 (four) hours as needed for Wheezing. 50 each 3    sertraline 50 MG Oral Tab Take 1 tablet (50 mg total) by mouth daily. 90 tablet 1    buPROPion  MG Oral Tablet 24 Hr Take 1 tablet (150 mg total) by mouth daily. 90 tablet 1    ALPRAZolam 0.25 MG Oral Tab Take 1 tablet (0.25 mg total) by mouth every 4 (four) hours as needed for Anxiety. 30 tablet 0      Past Medical History:    Depression    Tachycardia      History reviewed. No pertinent surgical history.   History reviewed. No pertinent family history.   Social History:   Social History     Socioeconomic History    Marital status:    Tobacco Use    Smoking status: Never    Smokeless tobacco: Never   Vaping Use    Vaping status: Never Used   Substance and Sexual Activity    Alcohol use: Yes    Drug use: No     Social Determinants of Health      Received [facility], [facility]    Social Connections    Received from [facility], 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   Exercise: walking.  Diet: low carb diet     REVIEW OF SYSTEMS:   GENERAL: feels well otherwise  SKIN some psoriatic patches  EYES:denies blurred vision or double vision  HEENT: denies nasal congestion, sinus pain or ST  LUNGS: denies shortness of breath with exertion  CARDIOVASCULAR: denies chest pain on exertion  GI: denies  abdominal pain,denies heartburn  : denies dysuria, vaginal discharge or itching,periods regular   MUSCULOSKELETAL: denies back pain  NEURO: denies headaches  PSYCHE + controlled  depression or anxiety  HEMATOLOGIC: denies hx of anemia  ENDOCRINE: denies thyroid history  ALL/ASTHMA: denies hx of allergy or asthma    EXAM:   /60   Pulse 74   Temp 97.8 °F (36.6 °C) (Tympanic)   Ht 5' 7\" (1.702 m)   Wt 193 lb 2 oz (87.6 kg)   LMP 04/04/2024 (Exact Date)   SpO2 97%   BMI 30.25 kg/m²   Body mass index is 30.25 kg/m².   GENERAL: well developed, well nourished,in no apparent distress  SKIN: no rashes,no suspicious lesions  HEENT: atraumatic, normocephalic,ears and throat are clear  EYES:PERRLA, EOMI, conjunctiva are clear  NECK: supple,no adenopathy,no bruits  CHEST: no chest tenderness  BREAST: no dominant or suspicious mass  LUNGS: clear to auscultation  CARDIO: RRR without murmur  GI: good BS's,no masses, HSM or tenderness  :deferred  MUSCULOSKELETAL: back is not tender,FROM of the back  EXTREMITIES: no cyanosis, clubbing or edema  NEURO: Oriented times three,cranial nerves are intact,motor and sensory are grossly intact    ASSESSMENT AND PLAN:   Nara Phelps is a 43 year old female who presents for a complete physical exam.     1. Routine adult health maintenance  - anticipatory care discussed  - diet  - sleep  - stress management  - functional movement  - monthly SBE  - CBC With Differential With Platelet  - Comp Metabolic Panel  - Lipid Panel  - TSH W Reflex To Free T4  - ThinPrep PAP Smear; Future  - Hpv Dna  High Risk , Thin Prep Collect; Future  - JOHNY ARLETH 2D+3D SCREENING BILAT (CPT=77067/65664); Future - gets at [facility]    2. Encounter for drug therapy  - reviewed current meds    3. Anxiety  - zoloft 50 mg  - buspar 10 mg tid prn  - wellbutrin  mg increased    4. History of exercise stress test  - stable    5. Class 1 obesity due to excess calories without serious comorbidity with body mass  index (BMI) of 31.0 to 31.9 in adult  - weight loss clinic discussed - Elizabeht Kuhn weight loss  - fasting  - consider optivia diet plan  - is on vyvnase 40mg     6. Sinus tachycardia  - metoprolol 50 mg   - TSH W Reflex To Free T4    7. Exercise induded asthma  - AAP and ACT  - proair  2 puffs before exercise  - declined pneumo 20    8. Psoriasis  - clobetasol oint bid to affected areas.     Self breast exam explained. Health maintenance, will check fasting Lipids, CMP, and CBC. Pt referred for screening colonoscopy. Pt info handouts given for: exercise, low fat diet and breast self-exam. Pt' s weight is Body mass index is 30.25 kg/m²., recommended low fat diet and aerobic exercise 30 minutes three times weekly.  The patient indicates understanding of these issues and agrees to the plan.    The patient is asked to return for CPX in 1 year.

## 2024-04-29 ENCOUNTER — OFFICE VISIT (OUTPATIENT)
Dept: FAMILY MEDICINE CLINIC | Facility: CLINIC | Age: 44
End: 2024-04-29
Payer: COMMERCIAL

## 2024-04-29 VITALS
HEIGHT: 67 IN | TEMPERATURE: 98 F | SYSTOLIC BLOOD PRESSURE: 100 MMHG | WEIGHT: 193.13 LBS | DIASTOLIC BLOOD PRESSURE: 60 MMHG | BODY MASS INDEX: 30.31 KG/M2 | OXYGEN SATURATION: 97 % | HEART RATE: 74 BPM

## 2024-04-29 DIAGNOSIS — Z00.00 ROUTINE ADULT HEALTH MAINTENANCE: Primary | ICD-10-CM

## 2024-04-29 DIAGNOSIS — Z79.899 ENCOUNTER FOR DRUG THERAPY: ICD-10-CM

## 2024-04-29 DIAGNOSIS — N95.1 PERIMENOPAUSAL: ICD-10-CM

## 2024-04-29 DIAGNOSIS — R00.0 TACHYCARDIA: ICD-10-CM

## 2024-04-29 DIAGNOSIS — R00.0 SINUS TACHYCARDIA: ICD-10-CM

## 2024-04-29 DIAGNOSIS — E66.09 CLASS 1 OBESITY DUE TO EXCESS CALORIES WITHOUT SERIOUS COMORBIDITY WITH BODY MASS INDEX (BMI) OF 31.0 TO 31.9 IN ADULT: ICD-10-CM

## 2024-04-29 DIAGNOSIS — J45.990 EXERCISE-INDUCED ASTHMA (HCC): ICD-10-CM

## 2024-04-29 DIAGNOSIS — F41.9 ANXIETY: ICD-10-CM

## 2024-04-29 DIAGNOSIS — N95.1 PERIMENOPAUSAL VASOMOTOR SYMPTOMS: ICD-10-CM

## 2024-04-29 DIAGNOSIS — Z92.89 HISTORY OF EXERCISE STRESS TEST: ICD-10-CM

## 2024-04-29 RX ORDER — METOPROLOL SUCCINATE 50 MG/1
50 TABLET, EXTENDED RELEASE ORAL DAILY
Qty: 90 TABLET | Refills: 3 | Status: SHIPPED | OUTPATIENT
Start: 2024-04-29

## 2024-04-29 RX ORDER — BUSPIRONE HYDROCHLORIDE 10 MG/1
10 TABLET ORAL 3 TIMES DAILY
Qty: 180 TABLET | Refills: 3 | Status: SHIPPED | OUTPATIENT
Start: 2024-04-29

## 2024-04-29 RX ORDER — BUPROPION HYDROCHLORIDE 300 MG/1
300 TABLET ORAL DAILY
Qty: 30 TABLET | Refills: 0 | Status: SHIPPED | OUTPATIENT
Start: 2024-04-29

## 2024-04-29 RX ORDER — ALBUTEROL SULFATE 90 UG/1
2 AEROSOL, METERED RESPIRATORY (INHALATION) EVERY 4 HOURS PRN
Qty: 8.5 G | Refills: 11 | Status: SHIPPED | OUTPATIENT
Start: 2024-04-29

## 2024-04-29 RX ORDER — CLONIDINE HYDROCHLORIDE 0.1 MG/1
0.1 TABLET ORAL NIGHTLY
Qty: 90 TABLET | Refills: 0 | Status: SHIPPED | OUTPATIENT
Start: 2024-04-29

## 2024-05-07 LAB
ABSOLUTE BASOPHILS: 48 CELLS/UL (ref 0–200)
ABSOLUTE EOSINOPHILS: 170 CELLS/UL (ref 15–500)
ABSOLUTE LYMPHOCYTES: 1770 CELLS/UL (ref 850–3900)
ABSOLUTE MONOCYTES: 360 CELLS/UL (ref 200–950)
ABSOLUTE NEUTROPHILS: 2952 CELLS/UL (ref 1500–7800)
ALBUMIN/GLOBULIN RATIO: 1.7 (CALC) (ref 1–2.5)
ALBUMIN: 4.4 G/DL (ref 3.6–5.1)
ALKALINE PHOSPHATASE: 49 U/L (ref 31–125)
ALT: 15 U/L (ref 6–29)
AST: 23 U/L (ref 10–30)
BASOPHILS: 0.9 %
BILIRUBIN, TOTAL: 0.6 MG/DL (ref 0.2–1.2)
BUN: 17 MG/DL (ref 7–25)
CALCIUM: 9.1 MG/DL (ref 8.6–10.2)
CARBON DIOXIDE: 27 MMOL/L (ref 20–32)
CHLORIDE: 103 MMOL/L (ref 98–110)
CHOL/HDLC RATIO: 2.4 (CALC)
CHOLESTEROL, TOTAL: 172 MG/DL
CREATININE: 0.83 MG/DL (ref 0.5–0.99)
EGFR: 91 ML/MIN/1.73M2
EOSINOPHILS: 3.2 %
GLOBULIN: 2.6 G/DL (CALC) (ref 1.9–3.7)
GLUCOSE: 93 MG/DL (ref 65–99)
HDL CHOLESTEROL: 71 MG/DL
HEMATOCRIT: 42.8 % (ref 35–45)
HEMOGLOBIN: 13.7 G/DL (ref 11.7–15.5)
LDL-CHOLESTEROL: 77 MG/DL (CALC)
LYMPHOCYTES: 33.4 %
MCH: 31.4 PG (ref 27–33)
MCHC: 32 G/DL (ref 32–36)
MCV: 97.9 FL (ref 80–100)
MONOCYTES: 6.8 %
MPV: 9.4 FL (ref 7.5–12.5)
NEUTROPHILS: 55.7 %
NON-HDL CHOLESTEROL: 101 MG/DL (CALC)
PLATELET COUNT: 301 THOUSAND/UL (ref 140–400)
POTASSIUM: 4.6 MMOL/L (ref 3.5–5.3)
PROTEIN, TOTAL: 7 G/DL (ref 6.1–8.1)
RDW: 12.2 % (ref 11–15)
RED BLOOD CELL COUNT: 4.37 MILLION/UL (ref 3.8–5.1)
SODIUM: 137 MMOL/L (ref 135–146)
THYROGLOBULIN ANTIBODIES: <1 IU/ML
THYROID PEROXIDASE$ANTIBODIES: 1 IU/ML
TRIGLYCERIDES: 142 MG/DL
TSH W/REFLEX TO FT4: 1.31 MIU/L
WHITE BLOOD CELL COUNT: 5.3 THOUSAND/UL (ref 3.8–10.8)

## 2024-05-09 ENCOUNTER — PATIENT MESSAGE (OUTPATIENT)
Dept: FAMILY MEDICINE CLINIC | Facility: CLINIC | Age: 44
End: 2024-05-09

## 2024-05-09 DIAGNOSIS — E66.09 CLASS 1 OBESITY DUE TO EXCESS CALORIES WITHOUT SERIOUS COMORBIDITY WITH BODY MASS INDEX (BMI) OF 31.0 TO 31.9 IN ADULT: ICD-10-CM

## 2024-05-09 DIAGNOSIS — F50.81 BINGE EATING DISORDER: ICD-10-CM

## 2024-05-09 DIAGNOSIS — E66.1 CLASS 1 DRUG-INDUCED OBESITY WITHOUT SERIOUS COMORBIDITY WITH BODY MASS INDEX (BMI) OF 32.0 TO 32.9 IN ADULT: ICD-10-CM

## 2024-05-10 RX ORDER — LISDEXAMFETAMINE DIMESYLATE 40 MG/1
40 CAPSULE ORAL EVERY MORNING
Qty: 30 CAPSULE | Refills: 0 | Status: SHIPPED | OUTPATIENT
Start: 2024-05-10 | End: 2024-06-09

## 2024-05-10 NOTE — TELEPHONE ENCOUNTER
From: Nara Phelps  To: ADEBAYO Archer  Sent: 5/9/2024 4:47 PM CDT  Subject: Med refill    Looking to refill lisdexamfetamine 40 MG Oral Cap please - [facility] [address]

## 2024-05-22 ENCOUNTER — TELEPHONE (OUTPATIENT)
Dept: FAMILY MEDICINE CLINIC | Facility: CLINIC | Age: 44
End: 2024-05-22

## 2024-05-22 NOTE — TELEPHONE ENCOUNTER
Nara,   Your mammogram is incomplete. [facility] will contact you to get a diagnostic mammogram of the right breast. They found indeterminate calcifications. You are still considered low risk.

## 2024-05-22 NOTE — TELEPHONE ENCOUNTER
Called pt and given results per Dr. Archer. Pt understands and is scheduled for repeat mammogram on Fri 5/31/23.

## 2024-06-03 ENCOUNTER — MED REC SCAN ONLY (OUTPATIENT)
Dept: FAMILY MEDICINE CLINIC | Facility: CLINIC | Age: 44
End: 2024-06-03

## 2024-06-03 ENCOUNTER — TELEPHONE (OUTPATIENT)
Dept: FAMILY MEDICINE CLINIC | Facility: CLINIC | Age: 44
End: 2024-06-03

## 2024-06-03 NOTE — TELEPHONE ENCOUNTER
Dr. Archer spoke with patient.    Order has been faxed back.  Patient will call to schedule an appointment.

## 2024-06-03 NOTE — TELEPHONE ENCOUNTER
Have you received the order for the breast biopsy yet? She wants to make the appointment for this week

## 2024-06-03 NOTE — TELEPHONE ENCOUNTER
The order needs to be faxed to [phone] and that is the breast biopsy department. They did not received the order that was faxed on Friday she said

## 2024-06-03 NOTE — TELEPHONE ENCOUNTER
Nara as we discussed you have a MILDLY suspicious right breast calcifications. The orders for the stereotactic biopsy are signed and faxed so you can proceed with scheduling the procedure.

## 2024-06-06 ENCOUNTER — PATIENT MESSAGE (OUTPATIENT)
Dept: FAMILY MEDICINE CLINIC | Facility: CLINIC | Age: 44
End: 2024-06-06

## 2024-06-07 NOTE — TELEPHONE ENCOUNTER
From: Nara Phelps  To: ADEBAYO Archer  Sent: 6/6/2024 10:47 AM CDT  Subject: Biopsy scheduled / med follow up     Hi Dr Archer,   I have a the biopsy scheduled for 6/25 which is agonizing to wait that long but next available. I am on cancellation list.   Also- I wanted to touch base about Wellbutrin increase- I really do think it is helping and only take buspar on average once a day instead of 3 times.

## 2024-06-07 NOTE — TELEPHONE ENCOUNTER
That is great. Dr. Acharya called me and we talked about you. I told him if [facility] couldn't get you in sooner that i would contact our [facility]. This will be a relief for you. Dr. Archer

## 2024-06-12 DIAGNOSIS — F41.9 ANXIETY: ICD-10-CM

## 2024-06-13 RX ORDER — BUPROPION HYDROCHLORIDE 300 MG/1
300 TABLET ORAL DAILY
Qty: 90 TABLET | Refills: 1 | Status: SHIPPED | OUTPATIENT
Start: 2024-06-13

## 2024-06-14 ENCOUNTER — PATIENT MESSAGE (OUTPATIENT)
Dept: FAMILY MEDICINE CLINIC | Facility: CLINIC | Age: 44
End: 2024-06-14

## 2024-06-14 DIAGNOSIS — F50.81 BINGE EATING DISORDER: ICD-10-CM

## 2024-06-14 DIAGNOSIS — E66.1 CLASS 1 DRUG-INDUCED OBESITY WITHOUT SERIOUS COMORBIDITY WITH BODY MASS INDEX (BMI) OF 32.0 TO 32.9 IN ADULT: ICD-10-CM

## 2024-06-14 DIAGNOSIS — E66.09 CLASS 1 OBESITY DUE TO EXCESS CALORIES WITHOUT SERIOUS COMORBIDITY WITH BODY MASS INDEX (BMI) OF 31.0 TO 31.9 IN ADULT: ICD-10-CM

## 2024-06-14 NOTE — TELEPHONE ENCOUNTER
From: Nara Phelps  To: ADEBAYO Archer  Sent: 6/14/2024 11:38 AM CDT  Subject: Vyvanse (generic) refill     I need a refill on generic vyvanse please

## 2024-06-14 NOTE — TELEPHONE ENCOUNTER
No protocol    OV 04/29/24  REFILL 05/10/24 #30    Future Appointments   Date Time Provider Department Center   6/20/2024 10:00 AM Emiliano Chiang DO [id number]

## 2024-06-15 RX ORDER — LISDEXAMFETAMINE DIMESYLATE 40 MG/1
40 CAPSULE ORAL EVERY MORNING
Qty: 30 CAPSULE | Refills: 0 | Status: SHIPPED | OUTPATIENT
Start: 2024-06-15 | End: 2024-07-15

## 2024-06-20 ENCOUNTER — OFFICE VISIT (OUTPATIENT)
Facility: LOCATION | Age: 44
End: 2024-06-20
Payer: COMMERCIAL

## 2024-06-20 VITALS — OXYGEN SATURATION: 98 % | TEMPERATURE: 98 F | HEART RATE: 78 BPM

## 2024-06-20 DIAGNOSIS — R92.8 ABNORMAL MAMMOGRAM: Primary | ICD-10-CM

## 2024-06-20 NOTE — H&P
Nara Phelps is a 43 year old female  Chief Complaint   Patient presents with    New Patient     NP- breast consult, imaging done at [facility] (reports in [place])         REFERRED BY    Patient presents with abnormal mammogram.  Patient was found to have 2 locations in the right breast with BI-RADS 4 calcifications, retroareolar and at 9 o'clock position.  Patient presented for stereotactic biopsy and they were unable to localize either of the locations.  They then recommended that the patient undergo 6-month follow-up mammogram.  Patient denies feeling a mass in either breast.  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  Patient has no family history of breast cancer  No family history of ovarian cancer  Positive aunt with cervical cancer  Positive family history colon cancer, uncle and grandmother  Patient is  3 para 3  Positive breast feeding  Age of first pregnancy 24  Age of menarche 15  Birth control pill x 10 years  No smoking history  No prior breast biopsies.       .           Past Medical History:    Depression    Tachycardia     No past surgical history on file.  Current Outpatient Medications on File Prior to Visit   Medication Sig Dispense Refill    lisdexamfetamine 40 MG Oral Cap Take 1 capsule (40 mg total) by mouth every morning. 30 capsule 0    buPROPion  MG Oral Tablet 24 Hr Take 1 tablet (300 mg total) by mouth daily. 90 tablet 1    cloNIDine 0.1 MG Oral Tab Take 1 tablet (0.1 mg total) by mouth nightly. 90 tablet 0    albuterol 108 (90 Base) MCG/ACT Inhalation Aero Soln Inhale 2 puffs into the lungs every 4 (four) hours as needed for Wheezing. 8.5 g 11    busPIRone 10 MG Oral Tab Take 1 tablet (10 mg total) by mouth 3 (three) times daily. 180 tablet 3    metoprolol succinate ER 50 MG Oral Tablet 24 Hr Take 1 tablet (50 mg total) by mouth daily. 90 tablet 3    clobetasol 0.05 % External Ointment Apply 1 Application topically 2 (two) times daily. APPLY TO AFFECTED AREA       albuterol (2.5 MG/3ML) 0.083% Inhalation Nebu Soln Take 3 mL (2.5 mg total) by nebulization every 4 (four) hours as needed for Wheezing. 50 each 3    sertraline 50 MG Oral Tab Take 1 tablet (50 mg total) by mouth daily. 90 tablet 1    ALPRAZolam 0.25 MG Oral Tab Take 1 tablet (0.25 mg total) by mouth every 4 (four) hours as needed for Anxiety. 30 tablet 0     No current facility-administered medications on file prior to visit.     @ALL@  No family history on file.  Social History     Socioeconomic History    Marital status:    Tobacco Use    Smoking status: Never    Smokeless tobacco: Never   Vaping Use    Vaping status: Never Used   Substance and Sexual Activity    Alcohol use: Yes    Drug use: No     Social Determinants of Health      [facility]    Social Connections    [facility] from [facility]    Housing Stability       ROS     GENERAL HEALTH: otherwise feels well, no weight loss, no fever or chills  SKIN: denies any unusual skin rashes or jaundice  HEENT: denies nasal congestion, sinus pain or sore throat; hearing loss negative,   EYES , no diplopia or vision changes  RESPIRATORY: denies shortness of breath, wheezing or cough   CARDIOVASCULAR: denies chest pain or ROMERO; no palpitations   GI: denies nausea, vomiting, constipation, diarrhea; no rectal bleeding; no heartburn  GENITAL/: no dysuria, urgency or frequency, no tea colored urine  MUSCULOSKELETAL: no joint complaints upper or lower extremities  HEMATOLOGY: denies hx anemia; denies bruising or excessive bleeding  Endocrine: no weight gain or loss no hot or cold intolerance    EXAM     Pulse 78, temperature 97.6 °F (36.4 °C), last menstrual period 04/04/2024, SpO2 98%, not currently breastfeeding.  GENERAL: well developed, well nourished female, in no apparent distress.    MENTAL STATUS :Alert, oriented x 3  PSYCH: normal mood and  affect  SKIN: anicteric, no rashes, no bruising  EYES: PERRLA, EOMI, sclera anicteric,  conjunctiva without pallor  HEENT: normocephalic, atraumatic, TMs clear, nares patent, mouth moist, pharynx w/o erythema  NECK: supple, no JVD, no adenopathy, no thyromegaly  RESPIRATORY: clear to auscultation, no rales , rhonchi or wheezing  CARDIOVASCULAR: RRR, murmur negative  ABDOMEN: normal active BS, soft, nondistended  no HSM, no masses or hernias  LYMPHATIC: no axillary , supraclavicular or inguinal lymphadenopathy  EXTREMITIES: no cyanosis, clubbing or edema, no atrophy, full ROM    STUDIES:     Office Visit on 04/29/2024   Component Date Value Ref Range Status    WHITE BLOOD CELL COUNT 05/06/2024 5.3  3.8 - 10.8 Thousand/uL Final    RED BLOOD CELL COUNT 05/06/2024 4.37  3.80 - 5.10 Million/uL Final    HEMOGLOBIN 05/06/2024 13.7  11.7 - 15.5 g/dL Final    HEMATOCRIT 05/06/2024 42.8  35.0 - 45.0 % Final    MCV 05/06/2024 97.9  80.0 - 100.0 fL Final    MCH 05/06/2024 31.4  27.0 - 33.0 pg Final    MCHC 05/06/2024 32.0  32.0 - 36.0 g/dL Final    RDW 05/06/2024 12.2  11.0 - 15.0 % Final    PLATELET COUNT 05/06/2024 301  140 - 400 Thousand/uL Final    MPV 05/06/2024 9.4  7.5 - 12.5 fL Final    ABSOLUTE NEUTROPHILS 05/06/2024 2,952  1,500 - 7,800 cells/uL Final    ABSOLUTE LYMPHOCYTES 05/06/2024 1,770  850 - 3,900 cells/uL Final    ABSOLUTE MONOCYTES 05/06/2024 360  200 - 950 cells/uL Final    ABSOLUTE EOSINOPHILS 05/06/2024 170  15 - 500 cells/uL Final    ABSOLUTE BASOPHILS 05/06/2024 48  0 - 200 cells/uL Final    NEUTROPHILS 05/06/2024 55.7  % Final    LYMPHOCYTES 05/06/2024 33.4  % Final    MONOCYTES 05/06/2024 6.8  % Final    EOSINOPHILS 05/06/2024 3.2  % Final    BASOPHILS 05/06/2024 0.9  % Final    GLUCOSE 05/06/2024 93  65 - 99 mg/dL Final    Comment:               Fasting reference interval         UREA NITROGEN (BUN) 05/06/2024 17  7 - 25 mg/dL Final    CREATININE 05/06/2024 0.83  0.50 - 0.99 mg/dL Final    EGFR  05/06/2024 91  > OR = 60 mL/min/1.73m2 Final    BUN/CREATININE RATIO 05/06/2024 SEE NOTE:  6 - 22 (calc) Final    Comment:    Not Reported: BUN and Creatinine are within     reference range.            SODIUM 05/06/2024 137  135 - 146 mmol/L Final    POTASSIUM 05/06/2024 4.6  3.5 - 5.3 mmol/L Final    CHLORIDE 05/06/2024 103  98 - 110 mmol/L Final    CARBON DIOXIDE 05/06/2024 27  20 - 32 mmol/L Final    CALCIUM 05/06/2024 9.1  8.6 - 10.2 mg/dL Final    PROTEIN, TOTAL 05/06/2024 7.0  6.1 - 8.1 g/dL Final    ALBUMIN 05/06/2024 4.4  3.6 - 5.1 g/dL Final    GLOBULIN 05/06/2024 2.6  1.9 - 3.7 g/dL (calc) Final    ALBUMIN/GLOBULIN RATIO 05/06/2024 1.7  1.0 - 2.5 (calc) Final    BILIRUBIN, TOTAL 05/06/2024 0.6  0.2 - 1.2 mg/dL Final    ALKALINE PHOSPHATASE 05/06/2024 49  31 - 125 U/L Final    AST 05/06/2024 23  10 - 30 U/L Final    ALT 05/06/2024 15  6 - 29 U/L Final    CHOLESTEROL, TOTAL 05/06/2024 172  <200 mg/dL Final    HDL CHOLESTEROL 05/06/2024 71  > OR = 50 mg/dL Final    TRIGLYCERIDES 05/06/2024 142  <150 mg/dL Final    LDL-CHOLESTEROL 05/06/2024 77  mg/dL (calc) Final    Comment: Reference range: <100     Desirable range <100 mg/dL for primary prevention;    <70 mg/dL for patients with CHD or diabetic patients   with > or = 2 CHD risk factors.     LDL-C is now calculated using the Carlos Enrique-Erna   calculation, which is a validated novel method providing   better accuracy than the Friedewald equation in the   estimation of LDL-C.   Carlos Enrique GARZON et al. NITHYA. 2013;310(19): 8592-1047   (http://education.WeissBeerger.BlockSpring/faq/RJN962)      CHOL/HDLC RATIO 05/06/2024 2.4  <5.0 (calc) Final    NON-HDL CHOLESTEROL 05/06/2024 101  <130 mg/dL (calc) Final    Comment: For patients with diabetes plus 1 major ASCVD risk   factor, treating to a non-HDL-C goal of <100 mg/dL   (LDL-C of <70 mg/dL) is considered a therapeutic   option.      TSH W/REFLEX TO FT4 05/06/2024 1.31  mIU/L Final    Comment:           Reference Range                          > or = 20 Years  0.40-4.50                              Pregnancy Ranges            First trimester    0.26-2.66            Second trimester   0.55-2.73            Third trimester    0.43-2.91      THYROGLOBULIN ANTIBODIES 05/06/2024 <1  < or = 1 IU/mL Final    THYROID PEROXIDASE$ANTIBODIES 05/06/2024 1  <9 IU/mL Final       ASSESSMENT   Imp: MRI the right breast attention calcium deposits.  Also patient will get second opinion on imaging from [facility] and whether stereotactic biopsy is possible.  This may require patient proceeding to the stereotactic table.  Patient with heterogenously dense breasts should undergo whole breast ultrasound in addition to her yearly mammogram      Meds & Refills for this Visit:  Requested Prescriptions      No prescriptions requested or ordered in this encounter       Imaging & Consults:  MRI BREAST (W+WO) W/CAD BILAT (CPT=77049)

## 2024-06-24 DIAGNOSIS — F41.9 ANXIETY: ICD-10-CM

## 2024-06-24 NOTE — TELEPHONE ENCOUNTER
Requested Prescriptions     Pending Prescriptions Disp Refills    sertraline 50 MG Oral Tab 90 tablet 1     Sig: Take 1 tablet (50 mg total) by mouth daily.     Last refill 12/29/23 #90 x 1   LOV 4/29/24  Future Appointments   Date Time Provider Department Center   7/17/2024  7:30 PM  MR RM2 (1.5T WIDE) [facility]

## 2024-07-15 DIAGNOSIS — E66.09 CLASS 1 OBESITY DUE TO EXCESS CALORIES WITHOUT SERIOUS COMORBIDITY WITH BODY MASS INDEX (BMI) OF 31.0 TO 31.9 IN ADULT: ICD-10-CM

## 2024-07-15 DIAGNOSIS — F50.81 BINGE EATING DISORDER: ICD-10-CM

## 2024-07-15 DIAGNOSIS — E66.1 CLASS 1 DRUG-INDUCED OBESITY WITHOUT SERIOUS COMORBIDITY WITH BODY MASS INDEX (BMI) OF 32.0 TO 32.9 IN ADULT: ICD-10-CM

## 2024-07-15 RX ORDER — LISDEXAMFETAMINE DIMESYLATE 40 MG/1
40 CAPSULE ORAL EVERY MORNING
Qty: 30 CAPSULE | Refills: 0 | Status: SHIPPED | OUTPATIENT
Start: 2024-07-15 | End: 2024-08-14

## 2024-07-15 NOTE — TELEPHONE ENCOUNTER
No protocol    OV 04/29/24  REFILL 06/15/24 #30    Future Appointments   Date Time Provider Department Center   7/17/2024  7:30 PM  MR RM2 (1.5T WIDE)  [facility]     Per last office visit note 04/29/24, patient was asked to return 4 weeks after for recheck. Mychart sent to patient to schedule medication follow up.

## 2024-07-17 ENCOUNTER — HOSPITAL ENCOUNTER (OUTPATIENT)
Dept: MRI IMAGING | Facility: HOSPITAL | Age: 44
Discharge: HOME | End: 2024-07-17
Attending: SURGERY
Payer: COMMERCIAL

## 2024-07-17 DIAGNOSIS — R92.8 ABNORMAL MAMMOGRAM: ICD-10-CM

## 2024-07-17 PROCEDURE — A9575 INJ GADOTERATE MEGLUMI 0.1ML: HCPCS | Performed by: SURGERY

## 2024-07-17 PROCEDURE — 77049 MRI BREAST C-+ W/CAD BI: CPT | Performed by: SURGERY

## 2024-07-17 RX ORDER — DIPHENHYDRAMINE HYDROCHLORIDE 50 MG/ML
10 INJECTION, SOLUTION INTRAMUSCULAR; INTRAVENOUS
Status: COMPLETED | OUTPATIENT
Start: 2024-07-17 | End: 2024-07-17

## 2024-07-17 RX ADMIN — DIPHENHYDRAMINE HYDROCHLORIDE 18 ML: 50 INJECTION, SOLUTION INTRAMUSCULAR; INTRAVENOUS at 20:49:00

## 2024-07-23 DIAGNOSIS — F41.9 ANXIETY: ICD-10-CM

## 2024-07-24 RX ORDER — ALPRAZOLAM 0.25 MG/1
0.25 TABLET ORAL EVERY 4 HOURS PRN
Qty: 30 TABLET | Refills: 0 | Status: SHIPPED | OUTPATIENT
Start: 2024-07-24

## 2024-07-24 NOTE — TELEPHONE ENCOUNTER
Last refill:11/01/23  qtY: 30  W/  0 refills  Last ov: 04/29/24    Requested Prescriptions     Pending Prescriptions Disp Refills    ALPRAZolam 0.25 MG Oral Tab 30 tablet 0     Sig: Take 1 tablet (0.25 mg total) by mouth every 4 (four) hours as needed for Anxiety.     No future appointments.

## 2024-08-12 DIAGNOSIS — E66.09 CLASS 1 OBESITY DUE TO EXCESS CALORIES WITHOUT SERIOUS COMORBIDITY WITH BODY MASS INDEX (BMI) OF 31.0 TO 31.9 IN ADULT: ICD-10-CM

## 2024-08-12 DIAGNOSIS — E66.1 CLASS 1 DRUG-INDUCED OBESITY WITHOUT SERIOUS COMORBIDITY WITH BODY MASS INDEX (BMI) OF 32.0 TO 32.9 IN ADULT: ICD-10-CM

## 2024-08-12 DIAGNOSIS — F50.81 BINGE EATING DISORDER: ICD-10-CM

## 2024-08-12 RX ORDER — LISDEXAMFETAMINE DIMESYLATE 40 MG/1
40 CAPSULE ORAL DAILY
Qty: 30 CAPSULE | Refills: 0 | Status: SHIPPED | OUTPATIENT
Start: 2024-08-12 | End: 2024-09-11

## 2024-08-12 RX ORDER — LISDEXAMFETAMINE DIMESYLATE 40 MG/1
40 CAPSULE ORAL EVERY MORNING
Qty: 30 CAPSULE | Refills: 0 | Status: CANCELLED | OUTPATIENT
Start: 2024-08-12 | End: 2024-09-11

## 2024-08-12 RX ORDER — LISDEXAMFETAMINE DIMESYLATE 40 MG/1
40 CAPSULE ORAL DAILY
Qty: 30 CAPSULE | Refills: 0 | Status: SHIPPED | OUTPATIENT
Start: 2024-09-12 | End: 2024-10-12

## 2024-08-12 RX ORDER — LISDEXAMFETAMINE DIMESYLATE 40 MG/1
40 CAPSULE ORAL DAILY
Qty: 30 CAPSULE | Refills: 0 | Status: SHIPPED | OUTPATIENT
Start: 2024-10-13 | End: 2024-11-12

## 2024-08-12 NOTE — TELEPHONE ENCOUNTER
Requested Prescriptions     Pending Prescriptions Disp Refills    lisdexamfetamine 40 MG Oral Cap 30 capsule 0     Sig: Take 1 capsule (40 mg total) by mouth every morning.     Last refill 7/15/24 #30  LOV 4/29/24  No future appointments.

## 2024-09-10 ENCOUNTER — PATIENT MESSAGE (OUTPATIENT)
Dept: FAMILY MEDICINE CLINIC | Facility: CLINIC | Age: 44
End: 2024-09-10

## 2024-09-10 DIAGNOSIS — E66.09 CLASS 1 OBESITY DUE TO EXCESS CALORIES WITHOUT SERIOUS COMORBIDITY WITH BODY MASS INDEX (BMI) OF 31.0 TO 31.9 IN ADULT: ICD-10-CM

## 2024-09-10 DIAGNOSIS — F50.81 BINGE EATING DISORDER: ICD-10-CM

## 2024-09-10 RX ORDER — LISDEXAMFETAMINE DIMESYLATE 40 MG/1
40 CAPSULE ORAL DAILY
Qty: 30 CAPSULE | Refills: 0 | OUTPATIENT
Start: 2024-09-10 | End: 2024-10-10

## 2024-09-10 NOTE — TELEPHONE ENCOUNTER
Last office visit:  04/29/24  Last refill:  08/12/24 -- Dr. Archer did send others for dates 09/12/24 and 10/13/24.  Will let patient know.  Last labs:   05/06/24      No future appointments.

## 2024-09-10 NOTE — TELEPHONE ENCOUNTER
From: Nara Phelps  To: ADEBAYO Archer  Sent: 9/10/2024 12:03 PM CDT  Subject: Vyvanse    Got the message regarding refills. It shows zero refills in jessica. I’ll call. Thanks for the info

## 2024-09-16 RX ORDER — LISDEXAMFETAMINE DIMESYLATE 40 MG/1
40 CAPSULE ORAL DAILY
Qty: 30 CAPSULE | Refills: 0
Start: 2024-09-16 | End: 2024-10-16

## 2024-09-16 NOTE — TELEPHONE ENCOUNTER
Calling pharmacy - scripts on file for 09/12 and 10/13/24 at [facility] on [facility].     Per pharmacist - 40mg dose out of stock - does not know when pharmacy will get it in. They only have 20mg cap of brand name with copay of $100.     Calling patient- she is completely out of medication. Would like script sent to [facility] today.    Calling [facility] on [facility] again to cancel script. Verified by pharmacist Caroline.    knee pain

## 2024-10-11 DIAGNOSIS — E66.811 CLASS 1 OBESITY DUE TO EXCESS CALORIES WITHOUT SERIOUS COMORBIDITY WITH BODY MASS INDEX (BMI) OF 31.0 TO 31.9 IN ADULT: ICD-10-CM

## 2024-10-11 DIAGNOSIS — E66.09 CLASS 1 OBESITY DUE TO EXCESS CALORIES WITHOUT SERIOUS COMORBIDITY WITH BODY MASS INDEX (BMI) OF 31.0 TO 31.9 IN ADULT: ICD-10-CM

## 2024-10-11 DIAGNOSIS — F50.819 BINGE EATING DISORDER: ICD-10-CM

## 2024-10-11 RX ORDER — LISDEXAMFETAMINE DIMESYLATE 40 MG/1
40 CAPSULE ORAL DAILY
Qty: 30 CAPSULE | Refills: 0 | Status: SHIPPED | OUTPATIENT
Start: 2024-12-12 | End: 2025-01-11

## 2024-10-11 RX ORDER — LISDEXAMFETAMINE DIMESYLATE 40 MG/1
40 CAPSULE ORAL DAILY
Qty: 30 CAPSULE | Refills: 0 | Status: CANCELLED | OUTPATIENT
Start: 2024-10-11 | End: 2024-11-10

## 2024-10-11 RX ORDER — LISDEXAMFETAMINE DIMESYLATE 40 MG/1
40 CAPSULE ORAL DAILY
Qty: 30 CAPSULE | Refills: 0 | Status: SHIPPED | OUTPATIENT
Start: 2024-11-11 | End: 2024-12-11

## 2024-10-11 RX ORDER — LISDEXAMFETAMINE DIMESYLATE 40 MG/1
40 CAPSULE ORAL DAILY
Qty: 30 CAPSULE | Refills: 0 | Status: SHIPPED | OUTPATIENT
Start: 2024-10-11 | End: 2024-11-10

## 2024-10-11 NOTE — TELEPHONE ENCOUNTER
No protocol    OV 04/29/24  REFILL 09/17/24 #30    No future appointments. Per 04/29/24 OV note - patient was to return on 05/29/24 - mychart sent to patient to schedule OV.

## 2024-11-04 NOTE — TELEPHONE ENCOUNTER
Vyvanse: 4/4/23 #30 w/ 0 refills  Alprazolam: 3/8/22 #30 w/ 0 refills  Buspirone: 3/15/23 #180 w/ 0 refills    Last OV: 3/29/23  Last labs: 2020-- getting done at [facility]. Msg was sent re: this. No future appointments. done

## 2024-11-11 DIAGNOSIS — E66.09 CLASS 1 OBESITY DUE TO EXCESS CALORIES WITHOUT SERIOUS COMORBIDITY WITH BODY MASS INDEX (BMI) OF 31.0 TO 31.9 IN ADULT: ICD-10-CM

## 2024-11-11 DIAGNOSIS — E66.811 CLASS 1 OBESITY DUE TO EXCESS CALORIES WITHOUT SERIOUS COMORBIDITY WITH BODY MASS INDEX (BMI) OF 31.0 TO 31.9 IN ADULT: ICD-10-CM

## 2024-11-11 DIAGNOSIS — F50.819 BINGE EATING DISORDER: ICD-10-CM

## 2024-11-11 RX ORDER — LISDEXAMFETAMINE DIMESYLATE 40 MG/1
40 CAPSULE ORAL DAILY
Qty: 30 CAPSULE | Refills: 0 | OUTPATIENT
Start: 2024-11-11 | End: 2024-12-11

## 2024-11-14 ENCOUNTER — PATIENT MESSAGE (OUTPATIENT)
Dept: FAMILY MEDICINE CLINIC | Facility: CLINIC | Age: 44
End: 2024-11-14

## 2024-11-14 DIAGNOSIS — E66.09 CLASS 1 OBESITY DUE TO EXCESS CALORIES WITHOUT SERIOUS COMORBIDITY WITH BODY MASS INDEX (BMI) OF 31.0 TO 31.9 IN ADULT: ICD-10-CM

## 2024-11-14 DIAGNOSIS — F50.819 BINGE EATING DISORDER: ICD-10-CM

## 2024-11-14 DIAGNOSIS — E66.811 CLASS 1 OBESITY DUE TO EXCESS CALORIES WITHOUT SERIOUS COMORBIDITY WITH BODY MASS INDEX (BMI) OF 31.0 TO 31.9 IN ADULT: ICD-10-CM

## 2024-11-14 RX ORDER — LISDEXAMFETAMINE DIMESYLATE 40 MG/1
40 CAPSULE ORAL DAILY
Qty: 30 CAPSULE | Refills: 0 | Status: SHIPPED | OUTPATIENT
Start: 2024-11-14 | End: 2024-12-14

## 2024-11-14 NOTE — TELEPHONE ENCOUNTER
See My Chart message    Patient requesting Vyvanse to [facility] due to availability    LOV  4-29-24    LAST LAB  5-6-24    LAST RX  10-11-24  #30    Next OV  No future appointments.

## 2024-12-05 DIAGNOSIS — F41.9 ANXIETY: ICD-10-CM

## 2024-12-06 RX ORDER — BUPROPION HYDROCHLORIDE 300 MG/1
300 TABLET ORAL DAILY
Qty: 90 TABLET | Refills: 0 | Status: SHIPPED | OUTPATIENT
Start: 2024-12-06

## 2024-12-26 DIAGNOSIS — F41.9 ANXIETY: ICD-10-CM

## 2025-01-02 ENCOUNTER — PATIENT MESSAGE (OUTPATIENT)
Dept: FAMILY MEDICINE CLINIC | Facility: CLINIC | Age: 45
End: 2025-01-02

## 2025-01-02 DIAGNOSIS — R92.8 ABNORMAL MAMMOGRAM: Primary | ICD-10-CM

## 2025-01-09 DIAGNOSIS — F50.819 BINGE EATING DISORDER: ICD-10-CM

## 2025-01-09 DIAGNOSIS — E66.811 CLASS 1 OBESITY DUE TO EXCESS CALORIES WITHOUT SERIOUS COMORBIDITY WITH BODY MASS INDEX (BMI) OF 31.0 TO 31.9 IN ADULT: ICD-10-CM

## 2025-01-09 DIAGNOSIS — E66.09 CLASS 1 OBESITY DUE TO EXCESS CALORIES WITHOUT SERIOUS COMORBIDITY WITH BODY MASS INDEX (BMI) OF 31.0 TO 31.9 IN ADULT: ICD-10-CM

## 2025-01-09 RX ORDER — LISDEXAMFETAMINE DIMESYLATE 40 MG/1
40 CAPSULE ORAL DAILY
Qty: 30 CAPSULE | Refills: 0 | Status: CANCELLED | OUTPATIENT
Start: 2025-01-09 | End: 2025-02-08

## 2025-01-09 RX ORDER — LISDEXAMFETAMINE DIMESYLATE 40 MG/1
40 CAPSULE ORAL DAILY
Qty: 30 CAPSULE | Refills: 0 | Status: SHIPPED | OUTPATIENT
Start: 2025-02-09 | End: 2025-03-11

## 2025-01-09 RX ORDER — LISDEXAMFETAMINE DIMESYLATE 40 MG/1
40 CAPSULE ORAL DAILY
Qty: 30 CAPSULE | Refills: 0 | Status: SHIPPED | OUTPATIENT
Start: 2025-03-12 | End: 2025-04-11

## 2025-01-09 RX ORDER — LISDEXAMFETAMINE DIMESYLATE 40 MG/1
40 CAPSULE ORAL DAILY
Qty: 30 CAPSULE | Refills: 0 | Status: SHIPPED | OUTPATIENT
Start: 2025-01-09 | End: 2025-02-08

## 2025-01-09 NOTE — TELEPHONE ENCOUNTER
Requested Prescriptions     Pending Prescriptions Disp Refills    lisdexamfetamine (VYVANSE) 40 MG Oral Cap 30 capsule 0     Sig: Take 1 capsule (40 mg total) by mouth daily.     Last refill 12/12/24 #30  LOV 4/29/24  No future appointments.

## 2025-01-13 DIAGNOSIS — L20.84 INTRINSIC ECZEMA: Primary | ICD-10-CM

## 2025-01-13 RX ORDER — CLOBETASOL PROPIONATE 0.5 MG/G
1 OINTMENT TOPICAL 2 TIMES DAILY
Qty: 45 G | Refills: 1 | Status: SHIPPED | OUTPATIENT
Start: 2025-01-13

## 2025-01-13 NOTE — TELEPHONE ENCOUNTER
Requested Prescriptions     Pending Prescriptions Disp Refills    clobetasol 0.05 % External Ointment  0     Sig: Apply 1 Application topically 2 (two) times daily. APPLY TO AFFECTED AREA     Last refill 2/16/24 #60g  LOV 4/29/24  No future appointments.

## 2025-03-11 ENCOUNTER — PATIENT MESSAGE (OUTPATIENT)
Dept: FAMILY MEDICINE CLINIC | Facility: CLINIC | Age: 45
End: 2025-03-11

## 2025-03-11 DIAGNOSIS — E66.811 CLASS 1 OBESITY DUE TO EXCESS CALORIES WITHOUT SERIOUS COMORBIDITY WITH BODY MASS INDEX (BMI) OF 31.0 TO 31.9 IN ADULT: ICD-10-CM

## 2025-03-11 DIAGNOSIS — F50.819 BINGE EATING DISORDER: ICD-10-CM

## 2025-03-11 DIAGNOSIS — E66.09 CLASS 1 OBESITY DUE TO EXCESS CALORIES WITHOUT SERIOUS COMORBIDITY WITH BODY MASS INDEX (BMI) OF 31.0 TO 31.9 IN ADULT: ICD-10-CM

## 2025-03-11 RX ORDER — LISDEXAMFETAMINE DIMESYLATE 40 MG/1
40 CAPSULE ORAL DAILY
Qty: 30 CAPSULE | Refills: 0 | Status: SHIPPED | OUTPATIENT
Start: 2025-03-12 | End: 2025-03-11

## 2025-03-11 RX ORDER — LISDEXAMFETAMINE DIMESYLATE 40 MG/1
40 CAPSULE ORAL DAILY
Qty: 30 CAPSULE | Refills: 0 | Status: SHIPPED | OUTPATIENT
Start: 2025-03-12 | End: 2025-04-11

## 2025-03-19 DIAGNOSIS — F41.9 ANXIETY: ICD-10-CM

## 2025-03-19 RX ORDER — BUPROPION HYDROCHLORIDE 300 MG/1
300 TABLET ORAL DAILY
Qty: 90 TABLET | Refills: 3 | Status: SHIPPED | OUTPATIENT
Start: 2025-03-19

## 2025-03-19 NOTE — TELEPHONE ENCOUNTER
LOV:4-  LAST LAB:5/6/2024  LAST RX:  buPROPion  MG Oral Tablet 24 Hr 90 tablet 0 12/6/2024 --   Sig:   Take 1 tablet (300 mg total) by mouth daily.     Next OV:No future appointments.  PROTOCOL

## 2025-03-19 NOTE — TELEPHONE ENCOUNTER
buPROPion  MG Oral Tablet 24 Hr         Sig: Take 1 tablet (300 mg total) by mouth daily.    Disp: 90 tablet    Refills: 0    Start: 3/19/2025    Class: Normal    For: Anxiety    Last ordered: 3 months ago (12/6/2024) by Shmuel Little MD    Psychiatric Non-Scheduled (Anti-Anxiety) [id number]/19/2025 03:58 PM   Protocol Details In person appointment or virtual visit in the past 6 mos or appointment in next 3 mos    Depression Screening completed within the past 12 months    Medication is active on med list

## 2025-04-14 ENCOUNTER — PATIENT MESSAGE (OUTPATIENT)
Dept: FAMILY MEDICINE CLINIC | Facility: CLINIC | Age: 45
End: 2025-04-14

## 2025-04-14 DIAGNOSIS — F50.819 BINGE EATING DISORDER: ICD-10-CM

## 2025-04-14 DIAGNOSIS — E66.811 CLASS 1 OBESITY DUE TO EXCESS CALORIES WITHOUT SERIOUS COMORBIDITY WITH BODY MASS INDEX (BMI) OF 31.0 TO 31.9 IN ADULT: ICD-10-CM

## 2025-04-14 DIAGNOSIS — E66.09 CLASS 1 OBESITY DUE TO EXCESS CALORIES WITHOUT SERIOUS COMORBIDITY WITH BODY MASS INDEX (BMI) OF 31.0 TO 31.9 IN ADULT: ICD-10-CM

## 2025-04-14 RX ORDER — LISDEXAMFETAMINE DIMESYLATE 40 MG/1
40 CAPSULE ORAL DAILY
Qty: 30 CAPSULE | Refills: 0 | Status: SHIPPED | OUTPATIENT
Start: 2025-04-14 | End: 2025-05-14

## 2025-04-14 RX ORDER — LISDEXAMFETAMINE DIMESYLATE 40 MG/1
40 CAPSULE ORAL DAILY
Qty: 30 CAPSULE | Refills: 0 | Status: SHIPPED | OUTPATIENT
Start: 2025-06-15 | End: 2025-07-15

## 2025-04-14 RX ORDER — LISDEXAMFETAMINE DIMESYLATE 40 MG/1
40 CAPSULE ORAL DAILY
Qty: 30 CAPSULE | Refills: 0 | Status: CANCELLED
Start: 2025-04-14 | End: 2025-05-14

## 2025-04-14 RX ORDER — LISDEXAMFETAMINE DIMESYLATE 40 MG/1
40 CAPSULE ORAL DAILY
Qty: 30 CAPSULE | Refills: 0 | Status: SHIPPED | OUTPATIENT
Start: 2025-05-15 | End: 2025-06-14

## 2025-04-14 NOTE — TELEPHONE ENCOUNTER
Requested Prescriptions     Pending Prescriptions Disp Refills    lisdexamfetamine (VYVANSE) 40 MG Oral Cap 30 capsule 0     Sig: Take 1 capsule (40 mg total) by mouth daily.     Last refill 3/12/25  LOV 4/9/24  No future appointments.

## 2025-05-12 ENCOUNTER — PATIENT MESSAGE (OUTPATIENT)
Dept: FAMILY MEDICINE CLINIC | Facility: CLINIC | Age: 45
End: 2025-05-12

## 2025-05-13 DIAGNOSIS — Z79.899 ENCOUNTER FOR DRUG THERAPY: ICD-10-CM

## 2025-05-13 DIAGNOSIS — N95.1 PERIMENOPAUSAL: ICD-10-CM

## 2025-05-13 DIAGNOSIS — R00.0 TACHYCARDIA: ICD-10-CM

## 2025-05-13 RX ORDER — METOPROLOL SUCCINATE 50 MG/1
50 TABLET, EXTENDED RELEASE ORAL DAILY
Qty: 90 TABLET | Refills: 0 | Status: SHIPPED | OUTPATIENT
Start: 2025-05-13

## 2025-05-13 NOTE — TELEPHONE ENCOUNTER
Last refill: 04/29/24  Qty 90  W/ 3 refills  Last ov: 04/29/24    Requested Prescriptions     Pending Prescriptions Disp Refills    METOPROLOL SUCCINATE ER 50 MG Oral Tablet 24 Hr [Pharmacy Med Name: METOPROLOL ER SUCCINATE 50MG TABS] 90 tablet 3     Sig: TAKE 1 TABLET(50 MG) BY MOUTH DAILY     No future appointments.

## 2025-06-11 ENCOUNTER — PATIENT MESSAGE (OUTPATIENT)
Dept: FAMILY MEDICINE CLINIC | Facility: CLINIC | Age: 45
End: 2025-06-11

## 2025-07-08 ENCOUNTER — PATIENT MESSAGE (OUTPATIENT)
Dept: FAMILY MEDICINE CLINIC | Facility: CLINIC | Age: 45
End: 2025-07-08

## 2025-07-08 RX ORDER — PREDNISONE 20 MG/1
20 TABLET ORAL 2 TIMES DAILY
Qty: 10 TABLET | Refills: 0 | Status: SHIPPED | OUTPATIENT
Start: 2025-07-08 | End: 2025-07-13

## 2025-07-11 ENCOUNTER — PATIENT MESSAGE (OUTPATIENT)
Dept: FAMILY MEDICINE CLINIC | Facility: CLINIC | Age: 45
End: 2025-07-11

## 2025-07-11 DIAGNOSIS — L23.7 POISON IVY DERMATITIS: Primary | ICD-10-CM

## 2025-07-12 DIAGNOSIS — F50.819 BINGE EATING DISORDER: ICD-10-CM

## 2025-07-12 DIAGNOSIS — E66.09 CLASS 1 OBESITY DUE TO EXCESS CALORIES WITHOUT SERIOUS COMORBIDITY WITH BODY MASS INDEX (BMI) OF 31.0 TO 31.9 IN ADULT: ICD-10-CM

## 2025-07-12 DIAGNOSIS — E66.811 CLASS 1 OBESITY DUE TO EXCESS CALORIES WITHOUT SERIOUS COMORBIDITY WITH BODY MASS INDEX (BMI) OF 31.0 TO 31.9 IN ADULT: ICD-10-CM

## 2025-07-12 RX ORDER — SULFAMETHOXAZOLE AND TRIMETHOPRIM 800; 160 MG/1; MG/1
1 TABLET ORAL 2 TIMES DAILY
Qty: 20 TABLET | Refills: 0 | Status: SHIPPED | OUTPATIENT
Start: 2025-07-12 | End: 2025-07-22

## 2025-07-12 NOTE — TELEPHONE ENCOUNTER
No protocol    OV 04/29/24  REFILL 06/15/25 #30    Future Appointments   Date Time Provider Department Center   8/27/2025  9:45 AM Gloria Archer, DO [facility]

## 2025-07-12 NOTE — TELEPHONE ENCOUNTER
Virtual Telephone Check-In    Nara DIMAS Lenin verbally {consents to/declines (Can be done by front staff):8618} a Virtual/Telephone Check-In visit on 07/12/25.  Patient has been referred to the [facility] website at [email or web address] to review the yearly Consent to Treat document.    Patient understands and accepts financial responsibility for any deductible, co-insurance and/or co-pays associated with this service.    Duration of the service: *** minutes      Summary of topics discussed:       {Assessment and Plan Smarlist and follow up recs (Optional):7906}      Sarah Gorman MD

## 2025-07-13 RX ORDER — LISDEXAMFETAMINE DIMESYLATE 40 MG/1
40 CAPSULE ORAL DAILY
Qty: 30 CAPSULE | Refills: 0 | Status: SHIPPED | OUTPATIENT
Start: 2025-07-13 | End: 2025-08-12

## 2025-07-14 RX ORDER — PREDNISONE 20 MG/1
TABLET ORAL
Qty: 10 TABLET | Refills: 0 | OUTPATIENT
Start: 2025-07-14

## 2025-07-14 RX ORDER — PREDNISONE 20 MG/1
20 TABLET ORAL 2 TIMES DAILY
Qty: 10 TABLET | Refills: 0 | Status: SHIPPED | OUTPATIENT
Start: 2025-07-14 | End: 2025-07-19

## 2025-07-21 ENCOUNTER — TELEPHONE (OUTPATIENT)
Dept: FAMILY MEDICINE CLINIC | Facility: CLINIC | Age: 45
End: 2025-07-21

## 2025-07-21 NOTE — TELEPHONE ENCOUNTER
See My Chart message dated 7-11-25, patient notified that message has been forwarded to covering provider.

## 2025-07-21 NOTE — TELEPHONE ENCOUNTER
Patient sent my chart message yesterday.  She wanted to be sure someone saw it.  She knows  is out of office & would like someone else to address.

## 2025-08-11 DIAGNOSIS — Z79.899 ENCOUNTER FOR DRUG THERAPY: ICD-10-CM

## 2025-08-11 DIAGNOSIS — R00.0 TACHYCARDIA: ICD-10-CM

## 2025-08-11 DIAGNOSIS — F50.819 BINGE EATING DISORDER: ICD-10-CM

## 2025-08-11 DIAGNOSIS — E66.811 CLASS 1 OBESITY DUE TO EXCESS CALORIES WITHOUT SERIOUS COMORBIDITY WITH BODY MASS INDEX (BMI) OF 31.0 TO 31.9 IN ADULT: ICD-10-CM

## 2025-08-11 DIAGNOSIS — E66.09 CLASS 1 OBESITY DUE TO EXCESS CALORIES WITHOUT SERIOUS COMORBIDITY WITH BODY MASS INDEX (BMI) OF 31.0 TO 31.9 IN ADULT: ICD-10-CM

## 2025-08-11 DIAGNOSIS — N95.1 PERIMENOPAUSAL: ICD-10-CM

## 2025-08-11 RX ORDER — LISDEXAMFETAMINE DIMESYLATE 40 MG/1
40 CAPSULE ORAL DAILY
Qty: 30 CAPSULE | Refills: 0 | Status: CANCELLED | OUTPATIENT
Start: 2025-08-11 | End: 2025-09-10

## 2025-08-11 RX ORDER — LISDEXAMFETAMINE DIMESYLATE 40 MG/1
40 CAPSULE ORAL DAILY
Qty: 30 CAPSULE | Refills: 0 | Status: SHIPPED | OUTPATIENT
Start: 2025-08-11 | End: 2025-09-10

## 2025-08-11 RX ORDER — LISDEXAMFETAMINE DIMESYLATE 40 MG/1
40 CAPSULE ORAL DAILY
Qty: 30 CAPSULE | Refills: 0 | Status: SHIPPED | OUTPATIENT
Start: 2025-09-11 | End: 2025-10-11

## 2025-08-11 RX ORDER — LISDEXAMFETAMINE DIMESYLATE 40 MG/1
40 CAPSULE ORAL DAILY
Qty: 30 CAPSULE | Refills: 0 | Status: SHIPPED | OUTPATIENT
Start: 2025-10-12 | End: 2025-11-11

## 2025-08-12 RX ORDER — METOPROLOL SUCCINATE 50 MG/1
TABLET, EXTENDED RELEASE ORAL
Qty: 90 TABLET | Refills: 3 | Status: SHIPPED | OUTPATIENT
Start: 2025-08-12

## 2025-08-27 ENCOUNTER — OFFICE VISIT (OUTPATIENT)
Dept: FAMILY MEDICINE CLINIC | Facility: CLINIC | Age: 45
End: 2025-08-27

## 2025-08-27 ENCOUNTER — LABORATORY ENCOUNTER (OUTPATIENT)
Dept: LAB | Age: 45
End: 2025-08-27
Attending: FAMILY MEDICINE

## 2025-08-27 VITALS
RESPIRATION RATE: 18 BRPM | TEMPERATURE: 98 F | BODY MASS INDEX: 30 KG/M2 | OXYGEN SATURATION: 97 % | HEART RATE: 75 BPM | DIASTOLIC BLOOD PRESSURE: 64 MMHG | SYSTOLIC BLOOD PRESSURE: 110 MMHG | WEIGHT: 194.25 LBS

## 2025-08-27 DIAGNOSIS — R00.0 TACHYCARDIA: ICD-10-CM

## 2025-08-27 DIAGNOSIS — F50.811 MODERATE BINGE-EATING DISORDER: ICD-10-CM

## 2025-08-27 DIAGNOSIS — E66.811 CLASS 1 OBESITY DUE TO EXCESS CALORIES WITHOUT SERIOUS COMORBIDITY WITH BODY MASS INDEX (BMI) OF 31.0 TO 31.9 IN ADULT: ICD-10-CM

## 2025-08-27 DIAGNOSIS — Z79.899 ENCOUNTER FOR DRUG THERAPY: Primary | ICD-10-CM

## 2025-08-27 DIAGNOSIS — R23.2 HOT FLASHES: ICD-10-CM

## 2025-08-27 DIAGNOSIS — N95.1 PERIMENOPAUSAL: ICD-10-CM

## 2025-08-27 DIAGNOSIS — E66.09 CLASS 1 OBESITY DUE TO EXCESS CALORIES WITHOUT SERIOUS COMORBIDITY WITH BODY MASS INDEX (BMI) OF 31.0 TO 31.9 IN ADULT: ICD-10-CM

## 2025-08-27 LAB
T4 FREE SERPL-MCNC: 1.4 NG/DL (ref 0.8–1.7)
TSI SER-ACNC: 1.39 UIU/ML (ref 0.55–4.78)

## 2025-08-27 PROCEDURE — 99214 OFFICE O/P EST MOD 30 MIN: CPT | Performed by: FAMILY MEDICINE

## 2025-08-27 RX ORDER — CETIRIZINE HYDROCHLORIDE 10 MG/1
10 TABLET ORAL DAILY
COMMUNITY

## 2025-08-29 ENCOUNTER — TELEPHONE (OUTPATIENT)
Dept: FAMILY MEDICINE CLINIC | Facility: CLINIC | Age: 45
End: 2025-08-29

## (undated) DIAGNOSIS — Z79.899 ENCOUNTER FOR DRUG THERAPY: ICD-10-CM

## (undated) DIAGNOSIS — R00.0 TACHYCARDIA: ICD-10-CM

## (undated) DIAGNOSIS — N95.1 PERIMENOPAUSAL: ICD-10-CM

## (undated) DIAGNOSIS — F41.9 ANXIETY: ICD-10-CM

## (undated) NOTE — LETTER
24    Patient: Nara Phelps  : 1980 Visit date: 2024    Dear  ADEBAYO Archer DO    Thank you for referring Nara Phelps to my practice.  Please find my assessment and plan below.        I saw Teri in the office, she presents with 2 locations in the right breast with suspicious calcium deposits.  Her initial biopsy was canceled at [facility].  I am encouraging her to undergo MRI of the right breast and reassessed imaging and biopsy possibility at [facility].  Thank you Jatinder  Sincerely,       Emiliano Chiang DO   CC:   No Recipients

## (undated) NOTE — LETTER
ASTHMA ACTION PLAN for Nara Phelps     : 1980     Date: 24  Doctor:  ADEBAYO Archer DO  Phone for doctor or clinic: [facility], Critical access hospital, [place]  [address] [address] [phone]           ACT Goal: 20 or greater    Call your provider if you require your rescue/quick reliever medication more than 2-3 times in a 24 hour period.    If you require your rescue inhaler/medication more than 2-3 times weekly, your asthma may not be under proper control and you should seek medical attention.    *Quick Relievers are Xopenex and Albuterol*    You can use the colors of a traffic light to help learn about your asthma medicines.  Year Round       1. Green - Go! % of Personal Best Peak Flow   Use controller medicine.   Breathing is good  No cough or wheeze  Can work and play Medicine How much to take When to take it    Medications       Sympathomimetics Instructions     albuterol 108 (90 Base) MCG/ACT Inhalation Aero Soln Inhale 2 puffs into the lungs every 4 (four) hours as needed for Wheezing.     albuterol (2.5 MG/3ML) 0.083% Inhalation Nebu Soln Take 3 mL (2.5 mg total) by nebulization every 4 (four) hours as needed for Wheezing.     ALBUTEROL 108 (90 Base) MCG/ACT Inhalation Aero Soln (Discontinued) INHALE 2 PUFFS INTO THE LUNGS EVERY 4 HOURS AS NEEDED FOR WHEEZING                    2. Yellow - Caution. 50-79% Personal Best Peak Flow  Use reliever medicine to keep an asthma attack from getting bad.   Cough  Quick Relievers  Wheezing  Tight Chest  Wake up at night Medicine How much to take When to take it    If symptoms are not improving in 24-48 hrs, call office for further instructions  Medications       Sympathomimetics Instructions     albuterol 108 (90 Base) MCG/ACT Inhalation Aero Soln Inhale 2 puffs into the lungs every 4 (four) hours as needed for Wheezing.     albuterol (2.5 MG/3ML) 0.083% Inhalation Nebu Soln Take 3 mL (2.5 mg total) by  nebulization every 4 (four) hours as needed for Wheezing.     ALBUTEROL 108 (90 Base) MCG/ACT Inhalation Aero Soln (Discontinued) INHALE 2 PUFFS INTO THE LUNGS EVERY 4 HOURS AS NEEDED FOR WHEEZING                    3. Red - Stop! Danger! <50% Personal Best Peak Flow  Continue Controller Medications But ADD:   Medicine not helping  Breathing is hard and fast  Nose opens wide  Can't walk  Ribs show  Can't talk well Medicine How much to take When to take it    If your symptoms do not improve in ONE hour -  go to the emergency room or call 911 immediately! If symptoms improve, call office for appointment immediately.    Albuterol inhaler 2 puffs every 20 minutes for three treatments       Don't forget:  Rinse mouth after using inhaler  Use spacer for inhaler  Remember to get your Flu vaccine every fall!    [x] Asthma Action Plan reviewed with the caregiver and patient, and a copy of the plan was given to the patient/caregiver.   [] Asthma Action Plan reviewed with the caregiver and patient on the phone, and copy mailed to patient/caregiver or sent via [employer].     Signatures:   Provider  ADEBAYO Archer, DO Patient  Nara Phelps Caretaker